# Patient Record
Sex: FEMALE | Race: WHITE | NOT HISPANIC OR LATINO | Employment: OTHER | URBAN - METROPOLITAN AREA
[De-identification: names, ages, dates, MRNs, and addresses within clinical notes are randomized per-mention and may not be internally consistent; named-entity substitution may affect disease eponyms.]

---

## 2023-05-08 LAB
CHOLESTEROL (MG/DL) IN SER/PLAS: 163 MG/DL (ref 0–199)
CHOLESTEROL IN HDL (MG/DL) IN SER/PLAS: 57.9 MG/DL
CHOLESTEROL/HDL RATIO: 2.8
LDL: 79 MG/DL (ref 0–99)
TRIGLYCERIDE (MG/DL) IN SER/PLAS: 133 MG/DL (ref 0–149)
VLDL: 27 MG/DL (ref 0–40)

## 2023-10-07 ASSESSMENT — ENCOUNTER SYMPTOMS
MYALGIAS: 0
BELCHING: 1
FEVER: 0
HEADACHES: 0
DIARRHEA: 1
ANOREXIA: 0
HEMATURIA: 0
NAUSEA: 0
ARTHRALGIAS: 0
HEMATOCHEZIA: 1
VOMITING: 0
CONSTIPATION: 1
WEIGHT LOSS: 0
ABDOMINAL PAIN: 1
DYSURIA: 0

## 2023-10-09 ENCOUNTER — OFFICE VISIT (OUTPATIENT)
Dept: PRIMARY CARE | Facility: CLINIC | Age: 63
End: 2023-10-09
Payer: MEDICARE

## 2023-10-09 ENCOUNTER — LAB (OUTPATIENT)
Dept: LAB | Facility: LAB | Age: 63
End: 2023-10-09
Payer: MEDICARE

## 2023-10-09 VITALS
HEIGHT: 65 IN | HEART RATE: 69 BPM | RESPIRATION RATE: 16 BRPM | OXYGEN SATURATION: 98 % | SYSTOLIC BLOOD PRESSURE: 119 MMHG | DIASTOLIC BLOOD PRESSURE: 75 MMHG | WEIGHT: 173 LBS | BODY MASS INDEX: 28.82 KG/M2

## 2023-10-09 DIAGNOSIS — R73.03 PREDIABETES: ICD-10-CM

## 2023-10-09 DIAGNOSIS — Z12.11 COLON CANCER SCREENING: ICD-10-CM

## 2023-10-09 DIAGNOSIS — R10.9 ABDOMINAL PAIN, UNSPECIFIED ABDOMINAL LOCATION: Primary | ICD-10-CM

## 2023-10-09 DIAGNOSIS — R10.9 ABDOMINAL PAIN, UNSPECIFIED ABDOMINAL LOCATION: ICD-10-CM

## 2023-10-09 PROBLEM — L82.0 INFLAMED SEBORRHEIC KERATOSIS: Status: ACTIVE | Noted: 2022-01-20

## 2023-10-09 PROBLEM — I20.1 CORONARY ARTERY SPASM (CMS-HCC): Status: ACTIVE | Noted: 2023-10-09

## 2023-10-09 PROBLEM — K21.9 GERD (GASTROESOPHAGEAL REFLUX DISEASE): Status: ACTIVE | Noted: 2023-10-09

## 2023-10-09 PROBLEM — E78.5 HLD (HYPERLIPIDEMIA): Status: ACTIVE | Noted: 2023-10-09

## 2023-10-09 PROBLEM — K58.9 IBS (IRRITABLE BOWEL SYNDROME): Status: ACTIVE | Noted: 2023-10-09

## 2023-10-09 PROBLEM — I73.00 RAYNAUD PHENOMENON: Status: ACTIVE | Noted: 2023-10-09

## 2023-10-09 LAB
ALBUMIN SERPL BCP-MCNC: 4.7 G/DL (ref 3.4–5)
ALP SERPL-CCNC: 76 U/L (ref 33–136)
ALT SERPL W P-5'-P-CCNC: 26 U/L (ref 7–45)
AMYLASE SERPL-CCNC: 48 U/L (ref 29–103)
ANION GAP SERPL CALC-SCNC: 11 MMOL/L (ref 10–20)
AST SERPL W P-5'-P-CCNC: 23 U/L (ref 9–39)
BILIRUB SERPL-MCNC: 0.4 MG/DL (ref 0–1.2)
BUN SERPL-MCNC: 16 MG/DL (ref 6–23)
CALCIUM SERPL-MCNC: 9.6 MG/DL (ref 8.6–10.6)
CHLORIDE SERPL-SCNC: 106 MMOL/L (ref 98–107)
CO2 SERPL-SCNC: 30 MMOL/L (ref 21–32)
CREAT SERPL-MCNC: 0.61 MG/DL (ref 0.5–1.05)
ERYTHROCYTE [DISTWIDTH] IN BLOOD BY AUTOMATED COUNT: 12.4 % (ref 11.5–14.5)
GFR SERPL CREATININE-BSD FRML MDRD: >90 ML/MIN/1.73M*2
GLUCOSE SERPL-MCNC: 106 MG/DL (ref 74–99)
HCT VFR BLD AUTO: 46.3 % (ref 36–46)
HGB BLD-MCNC: 14.4 G/DL (ref 12–16)
LIPASE SERPL-CCNC: 32 U/L (ref 9–82)
MCH RBC QN AUTO: 29.6 PG (ref 26–34)
MCHC RBC AUTO-ENTMCNC: 31.1 G/DL (ref 32–36)
MCV RBC AUTO: 95 FL (ref 80–100)
NRBC BLD-RTO: 0 /100 WBCS (ref 0–0)
PLATELET # BLD AUTO: 265 X10*3/UL (ref 150–450)
PMV BLD AUTO: 10.3 FL (ref 7.5–11.5)
POTASSIUM SERPL-SCNC: 4.5 MMOL/L (ref 3.5–5.3)
PROT SERPL-MCNC: 7.2 G/DL (ref 6.4–8.2)
RBC # BLD AUTO: 4.86 X10*6/UL (ref 4–5.2)
SODIUM SERPL-SCNC: 142 MMOL/L (ref 136–145)
WBC # BLD AUTO: 6.6 X10*3/UL (ref 4.4–11.3)

## 2023-10-09 PROCEDURE — 83690 ASSAY OF LIPASE: CPT

## 2023-10-09 PROCEDURE — 1159F MED LIST DOCD IN RCRD: CPT | Performed by: FAMILY MEDICINE

## 2023-10-09 PROCEDURE — 80053 COMPREHEN METABOLIC PANEL: CPT

## 2023-10-09 PROCEDURE — 82150 ASSAY OF AMYLASE: CPT

## 2023-10-09 PROCEDURE — 83036 HEMOGLOBIN GLYCOSYLATED A1C: CPT

## 2023-10-09 PROCEDURE — 99214 OFFICE O/P EST MOD 30 MIN: CPT | Performed by: FAMILY MEDICINE

## 2023-10-09 PROCEDURE — 85027 COMPLETE CBC AUTOMATED: CPT

## 2023-10-09 PROCEDURE — 1036F TOBACCO NON-USER: CPT | Performed by: FAMILY MEDICINE

## 2023-10-09 PROCEDURE — 1160F RVW MEDS BY RX/DR IN RCRD: CPT | Performed by: FAMILY MEDICINE

## 2023-10-09 PROCEDURE — 36415 COLL VENOUS BLD VENIPUNCTURE: CPT

## 2023-10-09 RX ORDER — LYSINE HCL 500 MG
2 TABLET ORAL DAILY
COMMUNITY
Start: 2018-05-21

## 2023-10-09 RX ORDER — CHOLECALCIFEROL (VITAMIN D3) 50 MCG
1 TABLET ORAL DAILY
COMMUNITY
Start: 2018-05-21

## 2023-10-09 RX ORDER — AMLODIPINE BESYLATE 5 MG/1
TABLET ORAL
COMMUNITY
Start: 2023-06-06

## 2023-10-09 RX ORDER — OMEPRAZOLE 40 MG/1
40 CAPSULE, DELAYED RELEASE ORAL
Qty: 30 CAPSULE | Refills: 0 | Status: SHIPPED | OUTPATIENT
Start: 2023-10-09

## 2023-10-09 RX ORDER — ATORVASTATIN CALCIUM 80 MG/1
80 TABLET, FILM COATED ORAL NIGHTLY
COMMUNITY
End: 2024-06-04 | Stop reason: SDUPTHER

## 2023-10-09 RX ORDER — LUTEIN 6 MG
2 TABLET ORAL DAILY
COMMUNITY
Start: 2018-05-21

## 2023-10-09 RX ORDER — PHENOL 1.4 %
1 AEROSOL, SPRAY (ML) MUCOUS MEMBRANE DAILY
COMMUNITY
Start: 2018-05-21

## 2023-10-09 NOTE — PATIENT INSTRUCTIONS
Advised patient of Doctor's note below. Patient verbalized understanding. No further questions at this time. Discussed DDx including GERD vs Diverticulitis vs Epiploic appendagitis vs pancreatitis vs Ulcer vs Malignancy (colon cancer vs ovarian cancer due to family history).  Nonfasting labs.  CT abdomen/pelvis.  Omeprazole provided.  Colonoscopy referral.  ER visit immediately if blood in stool returns.  Call, send message through [email or web address], or return to office prn if these symptoms worsen or fail to improve as anticipated.      Schedule annual wellness visit.

## 2023-10-09 NOTE — PROGRESS NOTES
"Subjective   Patient ID: Karyna Noguera is a 63 y.o. female who presents for Abdominal Pain (Digestive issue).    HPI  Last visit 9/20/21.    Reports history of irritable bowel syndrome.  Reports umbilical to LLQ pain x couple months.  Described as ache, almost like hunger pain.  Intermittent.  Daily.  Worse w/eating.  Improved w/BM.  Rated at max 5-6/10.  Rated on average 3/10.  Asso w/alternating diarrhea/constipation, occ nausea/vomiting.  No fevers.  Had bright red blood in stool 3 months ago (one night and following morning).  +Belching, occ acid taste in throat.  Did not schedule colonoscopy as ordered at last visit >2 yrs ago.  +FH colon cancer and ovarian cancer.    ROS  No other complaints.     Objective   /75   Pulse 69   Resp 16   Ht 1.651 m (5' 5\")   Wt 78.5 kg (173 lb)   SpO2 98%   BMI 28.79 kg/m²     Physical Exam  Constitutional:       General: She is not in acute distress.     Appearance: Normal appearance.   Abdominal:      General: Bowel sounds are normal. There is no distension.      Palpations: Abdomen is soft. There is no mass.      Tenderness: There is abdominal tenderness in the periumbilical area and left lower quadrant. There is no guarding or rebound.   Neurological:      Mental Status: She is oriented to person, place, and time.   Psychiatric:         Mood and Affect: Mood normal.         Behavior: Behavior normal.     Assessment/Plan   Diagnoses and all orders for this visit:  Abdominal pain, unspecified abdominal location  -     CBC; Future  -     Comprehensive metabolic panel; Future  -     Amylase; Future  -     Lipase; Future  -     CT abdomen pelvis wo IV contrast; Future  -     omeprazole (PriLOSEC) 40 mg DR capsule; Take 1 capsule (40 mg) by mouth once daily in the morning. Take before meals. Do not crush or chew.  Colon cancer screening  -     Colonoscopy Screening; Future    Discussed DDx including GERD vs Diverticulitis vs Epiploic appendagitis vs pancreatitis vs " Ulcer vs Malignancy (colon cancer vs ovarian cancer due to family history).  Nonfasting labs.  CT abdomen/pelvis.  Omeprazole provided.  Colonoscopy referral.  ER visit immediately if blood in stool returns.  Call, send message through [employer], or return to office prn if these symptoms worsen or fail to improve as anticipated.      Schedule annual wellness visit.    supervision

## 2023-10-10 DIAGNOSIS — R73.03 PREDIABETES: Primary | ICD-10-CM

## 2023-10-10 PROBLEM — R73.09 ELEVATED GLUCOSE: Status: ACTIVE | Noted: 2023-10-10

## 2023-10-10 PROBLEM — R73.09 ELEVATED GLUCOSE: Status: RESOLVED | Noted: 2023-10-10 | Resolved: 2023-10-10

## 2023-10-10 LAB
EST. AVERAGE GLUCOSE BLD GHB EST-MCNC: 117 MG/DL
HBA1C MFR BLD: 5.7 %

## 2023-10-11 DIAGNOSIS — Z12.11 COLON CANCER SCREENING: ICD-10-CM

## 2023-10-11 RX ORDER — POLYETHYLENE GLYCOL 3350, SODIUM SULFATE ANHYDROUS, SODIUM BICARBONATE, SODIUM CHLORIDE, POTASSIUM CHLORIDE 236; 22.74; 6.74; 5.86; 2.97 G/4L; G/4L; G/4L; G/4L; G/4L
4000 POWDER, FOR SOLUTION ORAL ONCE
Qty: 4000 ML | Refills: 0 | Status: SHIPPED | OUTPATIENT
Start: 2023-10-11 | End: 2023-10-11

## 2023-10-19 ENCOUNTER — ANCILLARY PROCEDURE (OUTPATIENT)
Dept: RADIOLOGY | Facility: CLINIC | Age: 63
End: 2023-10-19
Payer: MEDICARE

## 2023-10-19 DIAGNOSIS — R10.9 ABDOMINAL PAIN, UNSPECIFIED ABDOMINAL LOCATION: ICD-10-CM

## 2023-10-19 PROCEDURE — 74176 CT ABD & PELVIS W/O CONTRAST: CPT | Mod: ME

## 2023-10-19 PROCEDURE — 74176 CT ABD & PELVIS W/O CONTRAST: CPT | Performed by: RADIOLOGY

## 2023-10-24 PROBLEM — K42.9 UMBILICAL HERNIA WITHOUT OBSTRUCTION AND WITHOUT GANGRENE: Status: ACTIVE | Noted: 2023-10-24

## 2023-10-24 PROBLEM — K57.90 DIVERTICULOSIS: Status: ACTIVE | Noted: 2023-10-24

## 2023-10-24 PROBLEM — K76.0 FATTY LIVER: Status: ACTIVE | Noted: 2023-10-24

## 2023-11-01 ENCOUNTER — APPOINTMENT (OUTPATIENT)
Dept: GASTROENTEROLOGY | Facility: EXTERNAL LOCATION | Age: 63
End: 2023-11-01
Payer: MEDICARE

## 2023-11-09 ENCOUNTER — OFFICE VISIT (OUTPATIENT)
Dept: GASTROENTEROLOGY | Facility: EXTERNAL LOCATION | Age: 63
End: 2023-11-09
Payer: MEDICARE

## 2023-11-09 DIAGNOSIS — D12.8 BENIGN NEOPLASM OF RECTUM AND ANAL CANAL: ICD-10-CM

## 2023-11-09 DIAGNOSIS — Z80.0 FAMILY HISTORY OF MALIGNANT NEOPLASM OF GASTROINTESTINAL TRACT: ICD-10-CM

## 2023-11-09 DIAGNOSIS — Z12.11 COLON CANCER SCREENING: Primary | ICD-10-CM

## 2023-11-09 DIAGNOSIS — K64.8 INTERNAL HEMORRHOIDS: ICD-10-CM

## 2023-11-09 DIAGNOSIS — Z12.11 COLON CANCER SCREENING: ICD-10-CM

## 2023-11-09 DIAGNOSIS — K57.30 DIVERTICULOSIS OF LARGE INTESTINE WITHOUT DIVERTICULITIS: ICD-10-CM

## 2023-11-09 DIAGNOSIS — D12.9 BENIGN NEOPLASM OF RECTUM AND ANAL CANAL: ICD-10-CM

## 2023-11-09 PROCEDURE — 1159F MED LIST DOCD IN RCRD: CPT | Performed by: INTERNAL MEDICINE

## 2023-11-09 PROCEDURE — 45385 COLONOSCOPY W/LESION REMOVAL: CPT | Performed by: INTERNAL MEDICINE

## 2023-11-09 PROCEDURE — 1036F TOBACCO NON-USER: CPT | Performed by: INTERNAL MEDICINE

## 2023-11-09 PROCEDURE — 1160F RVW MEDS BY RX/DR IN RCRD: CPT | Performed by: INTERNAL MEDICINE

## 2024-06-04 ENCOUNTER — OFFICE VISIT (OUTPATIENT)
Dept: CARDIOLOGY | Facility: HOSPITAL | Age: 64
End: 2024-06-04
Payer: MEDICARE

## 2024-06-04 VITALS
HEART RATE: 59 BPM | BODY MASS INDEX: 29.99 KG/M2 | HEIGHT: 65 IN | SYSTOLIC BLOOD PRESSURE: 105 MMHG | WEIGHT: 180 LBS | DIASTOLIC BLOOD PRESSURE: 70 MMHG

## 2024-06-04 DIAGNOSIS — E78.5 HYPERLIPIDEMIA, UNSPECIFIED HYPERLIPIDEMIA TYPE: Primary | ICD-10-CM

## 2024-06-04 LAB
ATRIAL RATE: 59 BPM
P AXIS: 72 DEGREES
P OFFSET: 189 MS
P ONSET: 132 MS
PR INTERVAL: 182 MS
Q ONSET: 223 MS
QRS COUNT: 10 BEATS
QRS DURATION: 78 MS
QT INTERVAL: 398 MS
QTC CALCULATION(BAZETT): 394 MS
QTC FREDERICIA: 396 MS
R AXIS: 63 DEGREES
T AXIS: 77 DEGREES
T OFFSET: 422 MS
VENTRICULAR RATE: 59 BPM

## 2024-06-04 PROCEDURE — 1159F MED LIST DOCD IN RCRD: CPT | Performed by: INTERNAL MEDICINE

## 2024-06-04 PROCEDURE — 1036F TOBACCO NON-USER: CPT | Performed by: INTERNAL MEDICINE

## 2024-06-04 PROCEDURE — 93005 ELECTROCARDIOGRAM TRACING: CPT | Performed by: INTERNAL MEDICINE

## 2024-06-04 PROCEDURE — 93010 ELECTROCARDIOGRAM REPORT: CPT | Performed by: INTERNAL MEDICINE

## 2024-06-04 PROCEDURE — 99213 OFFICE O/P EST LOW 20 MIN: CPT | Performed by: INTERNAL MEDICINE

## 2024-06-04 PROCEDURE — 99213 OFFICE O/P EST LOW 20 MIN: CPT | Mod: 25 | Performed by: INTERNAL MEDICINE

## 2024-06-04 PROCEDURE — 1160F RVW MEDS BY RX/DR IN RCRD: CPT | Performed by: INTERNAL MEDICINE

## 2024-06-04 RX ORDER — ATORVASTATIN CALCIUM 80 MG/1
80 TABLET, FILM COATED ORAL NIGHTLY
Qty: 90 TABLET | Refills: 3 | Status: SHIPPED | OUTPATIENT
Start: 2024-06-04 | End: 2025-06-04

## 2024-06-04 NOTE — PROGRESS NOTES
Subjective:  Patient returns for a routine annual follow-up.  I was pleased to see that he has generally been clinically stable.  We follow her for marked hyperlipidemia.  She also has some Raynaud's.  She fortunately got through the winter with minimal need for amlodipine.  She denies any cardiac complaints at this time.  She has been compliant with her high-dose atorvastatin.  She has not had any hospitalizations and denies any other new health concerns.    Objective:  General: Alert, usual pleasant self.  HEENT: Unchanged.  Lungs: Clear without crackles or wheezing.  Cardiac: Distant heart tones without murmur rub or S3.  Abdomen: Nontender with normal bowel sounds.  Extremities: No edema.  Skin: Warm, dry without rash.    EKG: Sinus bradycardia.  Otherwise normal tracing.    Lipid panel: Cholesterol-163, HDL-58, LDL-79, TG-133.    Impression/plan:  Karyna is generally is doing quite well at this time.  She remains free of significant cardiac complaints.  Her blood pressure is in good range without need for antihypertensive therapy.  Her LDL looks quite significantly improved on high-dose atorvastatin.    I elected to embark on no additional testing at this time and will not make any medication changes.  I will see her back for routine annual follow-up assuming no intercurrent problems.    Patient instructions:    Continue current medications unchanged.    Return to clinic in 1 year.

## 2024-09-26 ENCOUNTER — HOSPITAL ENCOUNTER (OUTPATIENT)
Dept: RADIOLOGY | Facility: CLINIC | Age: 64
Discharge: HOME | End: 2024-09-26
Payer: MEDICARE

## 2024-09-26 ENCOUNTER — OFFICE VISIT (OUTPATIENT)
Dept: PRIMARY CARE | Facility: CLINIC | Age: 64
End: 2024-09-26
Payer: MEDICARE

## 2024-09-26 VITALS
WEIGHT: 182 LBS | HEIGHT: 65 IN | HEART RATE: 69 BPM | OXYGEN SATURATION: 98 % | BODY MASS INDEX: 30.32 KG/M2 | DIASTOLIC BLOOD PRESSURE: 71 MMHG | RESPIRATION RATE: 16 BRPM | SYSTOLIC BLOOD PRESSURE: 111 MMHG

## 2024-09-26 DIAGNOSIS — M79.604 PAIN OF RIGHT LOWER EXTREMITY: Primary | ICD-10-CM

## 2024-09-26 DIAGNOSIS — M79.604 PAIN OF RIGHT LOWER EXTREMITY: ICD-10-CM

## 2024-09-26 PROCEDURE — 1036F TOBACCO NON-USER: CPT | Performed by: FAMILY MEDICINE

## 2024-09-26 PROCEDURE — 3008F BODY MASS INDEX DOCD: CPT | Performed by: FAMILY MEDICINE

## 2024-09-26 PROCEDURE — 1160F RVW MEDS BY RX/DR IN RCRD: CPT | Performed by: FAMILY MEDICINE

## 2024-09-26 PROCEDURE — 73502 X-RAY EXAM HIP UNI 2-3 VIEWS: CPT | Mod: RIGHT SIDE | Performed by: STUDENT IN AN ORGANIZED HEALTH CARE EDUCATION/TRAINING PROGRAM

## 2024-09-26 PROCEDURE — 1159F MED LIST DOCD IN RCRD: CPT | Performed by: FAMILY MEDICINE

## 2024-09-26 PROCEDURE — 72110 X-RAY EXAM L-2 SPINE 4/>VWS: CPT

## 2024-09-26 PROCEDURE — 1123F ACP DISCUSS/DSCN MKR DOCD: CPT | Performed by: FAMILY MEDICINE

## 2024-09-26 PROCEDURE — 99214 OFFICE O/P EST MOD 30 MIN: CPT | Performed by: FAMILY MEDICINE

## 2024-09-26 PROCEDURE — 73502 X-RAY EXAM HIP UNI 2-3 VIEWS: CPT | Mod: RT

## 2024-09-26 RX ORDER — METHYLPREDNISOLONE 4 MG/1
TABLET ORAL
Qty: 21 TABLET | Refills: 0 | Status: SHIPPED | OUTPATIENT
Start: 2024-09-26 | End: 2024-10-03

## 2024-09-26 RX ORDER — METHOCARBAMOL 500 MG/1
500 TABLET, FILM COATED ORAL 4 TIMES DAILY PRN
Qty: 30 TABLET | Refills: 0 | Status: SHIPPED | OUTPATIENT
Start: 2024-09-26

## 2024-09-26 ASSESSMENT — ENCOUNTER SYMPTOMS
LOSS OF SENSATION IN FEET: 0
DEPRESSION: 0
OCCASIONAL FEELINGS OF UNSTEADINESS: 0

## 2024-09-26 NOTE — PATIENT INSTRUCTIONS
X-rays.  Oral steroid (do not take NSAIDs while taking steroid).  Muscle relaxer as needed.  Will consider physical therapy referral if symptoms persist.   Call, send message through [employer], or return to office prn if these symptoms worsen or fail to improve as anticipated.     Schedule annual wellness visit.

## 2024-09-26 NOTE — PROGRESS NOTES
"Subjective   Patient ID: Karyna Noguera is a 64 y.o. female who presents for Groin Pain (Right side down right leg ).    HPI  Right medial groin pain x 3 wks.  Onset after doing a lot of yard work.  Pain is sharp, shooting, ache.  Constant.  Worse w/bending, lifting leg, rotating hip.  Improved w/inactivity.  No help w/Aleve.  Rated at max 10/10.  Rated on average 5-6/10.  Radiates down to top of right foot.  No saddle anesthesia or incontinence.  No fevers.    Review of Systems  No other complaints.     Objective   /71   Pulse 69   Resp 16   Ht 1.651 m (5' 5\")   Wt 82.6 kg (182 lb)   SpO2 98%   BMI 30.29 kg/m²     Physical Exam  Constitutional:       General: She is not in acute distress.     Appearance: She is obese.   Musculoskeletal:      Lumbar back: No tenderness. Normal range of motion. Negative right straight leg raise test and negative left straight leg raise test.      Right hip: Decreased range of motion (due to pain).      Left hip: Normal range of motion.      Comments: Antalgic gait   Neurological:      Mental Status: She is oriented to person, place, and time.   Psychiatric:         Mood and Affect: Mood normal.         Behavior: Behavior normal.     Assessment/Plan   Diagnoses and all orders for this visit:  Pain of right lower extremity  -     XR lumbar spine 2-3 views; Future  -     XR hip right with pelvis when performed 2 or 3 views; Future  -     methylPREDNISolone (Medrol Dospak) 4 mg tablets; Take as directed on package.  -     methocarbamol (Robaxin) 500 mg tablet; Take 1 tablet (500 mg) by mouth 4 times a day as needed for muscle spasms.    X-rays.  Oral steroid (do not take NSAIDs while taking steroid).  Muscle relaxer as needed.  Will consider physical therapy referral if symptoms persist.   Call, send message through [employer], or return to office prn if these symptoms worsen or fail to improve as anticipated.     Schedule annual wellness visit.   "

## 2024-10-04 DIAGNOSIS — M79.604 PAIN OF RIGHT LOWER EXTREMITY: Primary | ICD-10-CM

## 2024-10-21 ENCOUNTER — EVALUATION (OUTPATIENT)
Dept: PHYSICAL THERAPY | Facility: CLINIC | Age: 64
End: 2024-10-21
Payer: MEDICARE

## 2024-10-21 DIAGNOSIS — M79.604 PAIN OF RIGHT LOWER EXTREMITY: ICD-10-CM

## 2024-10-21 PROCEDURE — 97110 THERAPEUTIC EXERCISES: CPT | Mod: GP | Performed by: PHYSICAL THERAPIST

## 2024-10-21 PROCEDURE — 97162 PT EVAL MOD COMPLEX 30 MIN: CPT | Mod: GP | Performed by: PHYSICAL THERAPIST

## 2024-10-21 ASSESSMENT — ENCOUNTER SYMPTOMS
LOSS OF SENSATION IN FEET: 0
OCCASIONAL FEELINGS OF UNSTEADINESS: 0
DEPRESSION: 0

## 2024-10-21 ASSESSMENT — COLUMBIA-SUICIDE SEVERITY RATING SCALE - C-SSRS
2. HAVE YOU ACTUALLY HAD ANY THOUGHTS OF KILLING YOURSELF?: NO
1. IN THE PAST MONTH, HAVE YOU WISHED YOU WERE DEAD OR WISHED YOU COULD GO TO SLEEP AND NOT WAKE UP?: NO
6. HAVE YOU EVER DONE ANYTHING, STARTED TO DO ANYTHING, OR PREPARED TO DO ANYTHING TO END YOUR LIFE?: NO

## 2024-10-21 ASSESSMENT — PATIENT HEALTH QUESTIONNAIRE - PHQ9
2. FEELING DOWN, DEPRESSED OR HOPELESS: NOT AT ALL
SUM OF ALL RESPONSES TO PHQ9 QUESTIONS 1 AND 2: 0
1. LITTLE INTEREST OR PLEASURE IN DOING THINGS: NOT AT ALL

## 2024-10-21 NOTE — PROGRESS NOTES
"Physical Therapy Evaluation    Patient Name: Karyna Noguera  MRN: [id number]  Today's Date: 10/21/2024  Visit: 1/MN  Referred by: Dr. Land  Time Calculation  Start Time: 1235  Stop Time: 1320  Time Calculation (min): 45 min  Diagnosis:   1. Pain of right lower extremity  Referral to Physical Therapy    Follow Up In Physical Therapy          PRECAUTIONS:   Low fall risk    SUBJECTIVE:  Patient reports over a month of (R) hip pain which radiates down to the leg to the knee and occasionally down to the foot. Does agree to some tingling and heaviness. Weakness in the (R)LE. Walking with a limp. Tried a steroid pack for a week without much change of symptoms. Insidious onset. Describes what can be a firework sensation which is abrupt.  Pain:  3/10 dull ache (R) thigh; max pain 9/10 \"electrical or firework, spasm\"  Home Living:  No concerns  Prior level of function:  Prior to onset of pain, was not limited in any ADLs. She occasionally goes on walks for exercise.  Personal factors that may impact care:  None    OBJECTIVE:  Lumbar AROM: (% movement)   Flexion 75%   Extension 50%*   Right Sidebend 50%*   Left Sidebend 75%   Right Rotation 50%*   Left Rotation 50%     Lumbar Repetitive Movement Response   Flexion in standing RLE pain, needs to bend knee   Extension in standing Midline LBP with (R) gluteal pain   Right Sideglide Increased central RLE pain including gluteal pain and groin pain   Left Sideglide No drastic change of symptoms   Flexion in supine Significant increase in RLE pain - primarily in L3 distribution   Extension in prone      Lumbar Sustained Movement Response   Flexion in standing RLE pain, needs to bend knee   Extension in standing Midline LBP with (R) gluteal pain   Right Sidelying Flex/Rot Significant (R) gluteal pain, difficult to discern peripheralization vs centralization   Left Sideglide No drastic change of symptoms   Flexion in supine Significant increase in RLE pain - primarily in L3 " distribution   Extension in prone Midline LBP with possible centralization - no lower leg symptoms     Core Strength: Sahrmann level 3+  Palpation: Increased TTP (R) gluteal mass  Special Testing: +VIVEK, + FADIR on RLE but this may be a false positive secondary to radicular pain upon flexion  Dermatomal Impairment: Pain following L3 dermatome  Myotomal Impairment:  (R) hip flexion- 3/5 (L3)    Outcome Measure:  LEFS: 35/80    ASSESSMENT:  Patient is a 64 year old female who presents to therapy on this date for evaluation of their (R)LE pain. Examination on this date reveals signs and symptoms suggestive of (R)LE L3 radiculopathy, which may be discal in nature. Deficits including weakness, abnormal gait, decreased/painful ROM, and altered balance; all of which are leading to difficulties with ADLs as well as recreational activity. Skilled physical therapy will address these deficits to help reduce pain for return to prior level of function.    Problem list: including weakness, abnormal gait, decreased/painful ROM, and altered balance    Moderate complexity due to patient's clinical presentation being evolving with changing characteristics, with comorbidities/complexities to include those listed above, all of which may negatively impact rehab tolerance and progression.     Clinical presentation:  Evolving with changing characteristics    TREATMENT:  - Therex:  Prone lying 1 min x3  Prone on elbows 1 min x3  Standing lumbar extensions x10  - Modalities:  CP to the lumbar spine in hooklying to conclude treatment    PATIENT EDUCATION:  Outpatient Education  Individual(s) Educated: Patient  Education Provided: Anatomy, Home Exercise Program, Physiology, POC, Posture  Patient/Caregiver Demonstrated Understanding: yes  Plan of Care Discussed and Agreed Upon: yes  Patient Response to Education: Patient/Caregiver Verbalized Understanding of Information, Patient/Caregiver Performed Return Demonstration of  Exercises/Activities, Patient/Caregiver Asked Appropriate Questions    HEP:  Access Code: [email or web address]  URL: [email or web address]  Date: 10/21/2024  Prepared by: Polo Sr    Exercises  - Lying Prone with 1 Pillow  - 2-3 x daily - 7 x weekly - 3 reps - 3 mins hold  - Lying Prone  - 2-3 x daily - 7 x weekly - 3 reps - 1 min hold  - Static Prone on Elbows  - 2-3 x daily - 7 x weekly - 3 reps - 1 min hold  - Standing Lumbar Extension  - 2-3 x daily - 7 x weekly - 2 sets - 10 reps    PLAN:   Treatment/Interventions: Cryotherapy, Education/ Instruction, Hot pack, Manual therapy, Mechanical traction, Neuromuscular re-education, Self care/ home management, Therapeutic exercises  PT Plan: Skilled PT  PT Frequency: 1 time per week  Duration: 8 weeks  Certification Period Start Date: 10/21/24  Certification Period End Date: 01/19/25  Rehab Potential: Fair  Plan of Care Agreement: Patient    GOALS:  Active       PT Problem       PT to exhibit normal gait pattern without evidence of antalgia.       Start:  10/21/24    Expected End:  12/16/24            Patient Stated Goal: no pain for return to normal activities including bowling.       Start:  10/21/24    Expected End:  12/16/24            Patient will maintain single leg stand right for >15 sec without loss of balance to indicate return of function.       Start:  10/21/24    Expected End:  12/16/24            Patient will achieve right hip flexion strength of 4+/5       Start:  10/21/24    Expected End:  12/16/24            Patient will demonstrate independence in home program for support of progression       Start:  10/21/24    Expected End:  12/16/24            Patient will report max pain of 3/10 demonstrating a reduction of overall pain       Start:  10/21/24    Expected End:  12/16/24

## 2024-10-24 ENCOUNTER — TREATMENT (OUTPATIENT)
Dept: PHYSICAL THERAPY | Facility: CLINIC | Age: 64
End: 2024-10-24
Payer: MEDICARE

## 2024-10-24 DIAGNOSIS — M79.604 PAIN OF RIGHT LOWER EXTREMITY: Primary | ICD-10-CM

## 2024-10-24 PROCEDURE — 97110 THERAPEUTIC EXERCISES: CPT | Mod: GP | Performed by: PHYSICAL THERAPIST

## 2024-10-24 NOTE — PROGRESS NOTES
"Physical Therapy Treatment    Patient Name: Karyna Noguera  MRN: [id number]  Today's Date: 10/24/2024   Visit 2/MN  Time Calculation  Start Time: 1445  Stop Time: 1515  Time Calculation (min): 30 min  1. Pain of right lower extremity            PRECAUTIONS:  Fall Risk: Low    SUBJECTIVE:  Patient reports that her symptoms are not too different despite 2 days of HEP. Has been icing as well. Did spend over an hour driving yesterday which resulted in increased pain.  Pain level: 3/10 dull ache (R) thigh; max pain 9/10 \"electrical or firework, spasm\"  Compliant with HEP? Yes    OBJECTIVE:  Varying degrees of RLE radicular pain with movement but reduced with extension and sidelying flexion/rotation positioning.    TREATMENT:  - Therex:  LUE on wall sideglide mobilizations (L) hip falling towards wall 2x10  Standing lumbar extensions 2x10  (R) sidelying flexion/rotation x3 mins  (R) sidelying flexion/rotation open books 2x10  Prone lying/prone prop cycling 30 sec cycles x3  Prone press up x10    - Manual Therapy:  Lateral shift correction shifting hips to the (L) - 2 mins      ASSESSMENT:  Patient likely still with radicular symptoms in the RLE secondary to lateral disc herniation. Ongoing sustained and repeated movements needed to further resolve symptoms.    EDUCATION:  Added sideglides hips to the (L)    PLAN:    Monitor symptoms closely 2x/week in clinic to ensure radicular symptoms are resolving with prescribed movements of the spine.    "

## 2024-10-28 ENCOUNTER — TREATMENT (OUTPATIENT)
Dept: PHYSICAL THERAPY | Facility: CLINIC | Age: 64
End: 2024-10-28
Payer: MEDICARE

## 2024-10-28 DIAGNOSIS — M79.604 PAIN OF RIGHT LOWER EXTREMITY: ICD-10-CM

## 2024-10-28 PROCEDURE — 97110 THERAPEUTIC EXERCISES: CPT | Mod: GP | Performed by: PHYSICAL THERAPIST

## 2024-11-06 ENCOUNTER — TREATMENT (OUTPATIENT)
Dept: PHYSICAL THERAPY | Facility: CLINIC | Age: 64
End: 2024-11-06
Payer: MEDICARE

## 2024-11-06 DIAGNOSIS — M79.604 PAIN OF RIGHT LOWER EXTREMITY: Primary | ICD-10-CM

## 2024-11-06 PROCEDURE — 97535 SELF CARE MNGMENT TRAINING: CPT | Mod: GP | Performed by: PHYSICAL THERAPIST

## 2024-11-06 PROCEDURE — 97012 MECHANICAL TRACTION THERAPY: CPT | Mod: GP | Performed by: PHYSICAL THERAPIST

## 2024-11-06 NOTE — PROGRESS NOTES
"Physical Therapy Treatment    Patient Name: Karyna Noguera  MRN: [id number]  Today's Date: 11/6/2024   Visit 4/MN  Time Calculation  Start Time: 1717  Stop Time: 1800  Time Calculation (min): 43 min  1. Pain of right lower extremity              PRECAUTIONS:  Fall Risk: Low    SUBJECTIVE:  Patient reports that her symptoms seem no different since last week. She still has a sharp pain in the (R) hip upon stand from a seated position. The open-book exercises still help quite a bit but the sideglides seem to be worsening pain. Difficulty sitting on firm surfaces.  Pain level: 3/10 dull ache (R) thigh; max pain 9/10 \"electrical or firework, spasm\"  Compliant with HEP? Yes    OBJECTIVE:  Sharp (R) hip pain with passive log roll IR or ER, passive hip flexion, abduction     TREATMENT:  - Manual Therapy:  RLE long axis distraction - grade 2-3 - ceased due to pain  Hooklying (R) hip mobilizations, inferior grade 2-3 - ceased due to pain    - Modalities:  Intermittent traction to the lumbar spine 85#/35#, 15mins 60\"/10\"    ASSESSMENT:  Patient's (R) radicular symptoms seemingly improved as compared to start of therapy. Unfortunately, she is still suffering from significant sharp pain in the (R) hip joint which could be indicative of labral injury or loose body. Further investigation from orthopedics may be helpful in identifying source of pain as well as offer other treatment options.    EDUCATION:      PLAN:   Hold on PT and send pt to hip specialist.    "

## 2024-11-08 ENCOUNTER — PREP FOR PROCEDURE (OUTPATIENT)
Dept: ORTHOPEDIC SURGERY | Age: 64
End: 2024-11-08
Payer: MEDICARE

## 2024-11-15 ENCOUNTER — APPOINTMENT (OUTPATIENT)
Dept: PHYSICAL THERAPY | Facility: CLINIC | Age: 64
End: 2024-11-15
Payer: MEDICARE

## 2024-11-18 ENCOUNTER — HOSPITAL ENCOUNTER (OUTPATIENT)
Dept: RADIOLOGY | Facility: CLINIC | Age: 64
Discharge: HOME | End: 2024-11-18
Payer: MEDICARE

## 2024-11-18 ENCOUNTER — APPOINTMENT (OUTPATIENT)
Dept: ORTHOPEDIC SURGERY | Facility: CLINIC | Age: 64
End: 2024-11-18
Payer: MEDICARE

## 2024-11-18 VITALS — HEIGHT: 65 IN | WEIGHT: 185 LBS | BODY MASS INDEX: 30.82 KG/M2

## 2024-11-18 DIAGNOSIS — M79.604 PAIN OF RIGHT LOWER EXTREMITY: ICD-10-CM

## 2024-11-18 DIAGNOSIS — S76.011A STRAIN OF HIP FLEXOR, RIGHT, INITIAL ENCOUNTER: ICD-10-CM

## 2024-11-18 PROCEDURE — 3008F BODY MASS INDEX DOCD: CPT | Performed by: ORTHOPAEDIC SURGERY

## 2024-11-18 PROCEDURE — 72170 X-RAY EXAM OF PELVIS: CPT

## 2024-11-18 PROCEDURE — 1125F AMNT PAIN NOTED PAIN PRSNT: CPT | Performed by: ORTHOPAEDIC SURGERY

## 2024-11-18 PROCEDURE — 1123F ACP DISCUSS/DSCN MKR DOCD: CPT | Performed by: ORTHOPAEDIC SURGERY

## 2024-11-18 PROCEDURE — 1159F MED LIST DOCD IN RCRD: CPT | Performed by: ORTHOPAEDIC SURGERY

## 2024-11-18 PROCEDURE — 72170 X-RAY EXAM OF PELVIS: CPT | Performed by: RADIOLOGY

## 2024-11-18 PROCEDURE — 99204 OFFICE O/P NEW MOD 45 MIN: CPT | Performed by: ORTHOPAEDIC SURGERY

## 2024-11-18 ASSESSMENT — PAIN - FUNCTIONAL ASSESSMENT: PAIN_FUNCTIONAL_ASSESSMENT: 0-10

## 2024-11-18 ASSESSMENT — PAIN SCALES - GENERAL: PAINLEVEL_OUTOF10: 4

## 2024-11-18 NOTE — PROGRESS NOTES
PRIMARY CARE PHYSICIAN: Bijan Land MD  REFERRING PROVIDER: No referring provider defined for this encounter.     CONSULT ORTHOPAEDIC: Hip Evaluation        ASSESSMENT & PLAN    IMPRESSION:  1.  Strain of right hip flexor    PLAN:  Discussed with patient findings above and reviewed her current x-rays with her.  She does have mild osteoarthritis which is asymptomatic on her examination I do think the vast majority of her symptoms are secondary to weakness and a strain to her right hip flexor.  Did review that this could also be causing some referred back pain and would recommend working with physical therapy for this current condition.  We did give her a new order for therapy today.  If she fails to improve or worsens next few weeks may consider an MRI but do not see any sort of red flag symptoms at this time to proceed with advanced imaging at after this appointment.  May continue the anti-inflammatories as needed, will follow-up if she fails to improve.      SUBJECTIVE  CHIEF COMPLAINT:   Chief Complaint   Patient presents with    Right Hip - Pain        HPI: Karyna Noguera is a 64 y.o. patient. Karyna Noguera has had progressive problems with the right hip over the past 2 months. They do not report any trauma. They do not report any constant or progressive numbness or tingling in their legs. Their symptoms are interfering with activities which include pain when crossing her legs or bending to put her shoes on.  Her pain is on the lateral side and into her groin.  She does have pain getting up from sitting.  She does have some low back pain that goes into her butt and down her whole leg.      FUNCTIONAL STATUS: not limited.  AMBULATORY STATUS:  independent   PREVIOUS TREATMENTS: NSAIDS Naproxen  with mild improvement  Therapy for 2-3 weeks  still taking with little improvement   HISTORY OF SURGERY ON AFFECTED HIP(S): No   BACK PAIN REPORTED: Yes       REVIEW OF SYSTEMS  Constitutional: See HPI for pain  assessment, No significant weight loss, recent trauma  Cardiovascular: No chest pain, shortness of breath  Respiratory: No difficulty breathing, cough  Gastrointestinal: No nausea, vomiting, diarrhea, constipation  Musculoskeletal: Noted in HPI, positive for pain, restricted motion, stiffness  Integumentary: No rashes, easy bruising, redness   Neurological: no numbness or tingling in extremities, no gait disturbances   Psychiatric: No mood changes, memory changes, social issues  Heme/Lymph: no excessive swelling, easy bruising, excessive bleeding  ENT: No hearing changes  Eyes: No vision changes    Past Medical History:   Diagnosis Date    Achilles tendinitis, unspecified leg 06/26/2019    Achilles tendon pain    Other abnormal glucose 11/18/2018    Elevated glucose    Other shoulder lesions, right shoulder 08/12/2019    Right rotator cuff tendinitis    Pain in right shoulder 07/15/2019    Right shoulder pain    Personal history of other diseases of the female genital tract     History of endometriosis    Strain of unspecified muscle, fascia and tendon at shoulder and upper arm level, right arm, initial encounter 07/15/2019    Strain of shoulder, right        No Known Allergies     Past Surgical History:   Procedure Laterality Date    APPENDECTOMY  05/21/2018    Appendectomy    HYSTERECTOMY  05/21/2018    Hysterectomy    OTHER SURGICAL HISTORY  09/20/2021    Laparoscopy    REFRACTIVE SURGERY  06/13/2018    Corneal LASIK    TONSILLECTOMY  05/21/2018    Tonsillectomy        Family History   Problem Relation Name Age of Onset    Stomach cancer Mother      Colon cancer Brother          Social History     Socioeconomic History    Marital status:      Spouse name: Not on file    Number of children: Not on file    Years of education: Not on file    Highest education level: Not on file   Occupational History    Not on file   Tobacco Use    Smoking status: Former     Current packs/day: 0.00     Average packs/day: 1.5  packs/day for 10.0 years (15.0 ttl pk-yrs)     Types: Cigarettes     Start date:      Quit date:      Years since quittin.9    Smokeless tobacco: Never   Substance and Sexual Activity    Alcohol use: Yes    Drug use: Not Currently    Sexual activity: Not on file   Other Topics Concern    Not on file   Social History Narrative    Not on file     Social Drivers of Health     Financial Resource Strain: Not on file   Food Insecurity: Not on file   Transportation Needs: Not on file   Physical Activity: Not on file   Stress: Not on file   Social Connections: Not on file   Intimate Partner Violence: Not on file   Housing Stability: Not on file        CURRENT MEDICATIONS:   Current Outpatient Medications   Medication Sig Dispense Refill    amLODIPine (Norvasc) 5 mg tablet Take by mouth once daily. Only takes in winter      atorvastatin (Lipitor) 80 mg tablet Take 1 tablet (80 mg) by mouth once daily at bedtime. 90 tablet 3    calcium carbonate-vit D3-min 600 mg calcium- 400 unit tablet Take 2 tablets by mouth once daily.      cholecalciferol (Vitamin D-3) 50 MCG (2000 UT) tablet Take 1 tablet (2,000 Units) by mouth once daily.      fish oil concentrate (Omega-3) 120-180 mg capsule Take 2 capsules (2 g) by mouth once daily.      methocarbamol (Robaxin) 500 mg tablet Take 1 tablet (500 mg) by mouth 4 times a day as needed for muscle spasms. 30 tablet 0    multivitamin with minerals (Adults Multivitamin) tablet Take 1 tablet by mouth once daily.      omeprazole (PriLOSEC) 40 mg DR capsule Take 1 capsule (40 mg) by mouth once daily in the morning. Take before meals. Do not crush or chew. 30 capsule 0    vitamin E acid succinate (vitamin E succinate) 268 mg (400 unit) tablet Take 2 tablets by mouth once daily.       No current facility-administered medications for this visit.        OBJECTIVE    PHYSICAL EXAM      2022     4:38 PM 2023     9:30 AM 2023     9:57 AM 10/9/2023    10:31 AM 2024     9:37  "AM 6/4/2024    12:49 PM 9/26/2024     8:46 AM   Vitals   Systolic 115  120 119  105 111   Diastolic 75  80 75  70 71   Heart Rate 66 59 59 69 59 59 69   Resp    16   16   Height (in)  1.651 m (5' 5\")  1.651 m (5' 5\") 1.651 m (5' 5\")  1.651 m (5' 5\")   Weight (lb)  173.04  173 180  182   BMI  28.8 kg/m2  28.79 kg/m2 29.95 kg/m2  30.29 kg/m2   BSA (m2)  1.9 m2  1.9 m2 1.93 m2  1.95 m2   Visit Report    Report Report Report Report      There is no height or weight on file to calculate BMI.    GENERAL: A/Ox3, NAD. Appears healthy, well nourished  PSYCHIATRIC: Mood stable, appropriate memory recall  EYES: EOM intact, no scleral icterus  CARDIAC: regular rate  LUNGS: Breathing non-labored  SKIN: no erythema, rashes, or ecchymoses     MUSCULOSKELETAL:  Laterality: right Hip Exam  - ROM, Extension: Full, no flexion contracture, when lying supine with a stretch with her leg on extension and some reproduction of pain at the anterior aspect of the hip in the groin  - Strength: Abduction 5/5, Flexion 4-/5 limited by pain with positive Stinchfield  - Palpation: Nontender along hip  - Log roll/IR exam: Good motion, nonpainful  - Straight leg raise: Negative  - EHL/PF/DF motor intact  - Gait: Normal, no assistive device    NEUROVASCULAR:  - Neurovascular Status: sensation intact to light touch distally  - Capillary refill brisk at extremities, Bilateral dorsalis pedis pulse 2+           IMAGING:  Multiple views of the affected right hip(s) demonstrate: Mild joint space narrowing and minimal arthritic changes.   X-rays were personally reviewed and interpreted by me.  Radiology reports were reviewed by me as well, if readily available at the time.        Devante Ríos DO  Attending Surgeon  Joint Replacement and Adult Reconstructive Surgery  [place], [place]                         "

## 2024-11-22 ENCOUNTER — TREATMENT (OUTPATIENT)
Dept: PHYSICAL THERAPY | Facility: CLINIC | Age: 64
End: 2024-11-22
Payer: MEDICARE

## 2024-11-22 DIAGNOSIS — M79.604 PAIN OF RIGHT LOWER EXTREMITY: ICD-10-CM

## 2024-11-22 PROCEDURE — 97110 THERAPEUTIC EXERCISES: CPT | Mod: GP | Performed by: PHYSICAL THERAPIST

## 2024-11-22 NOTE — PROGRESS NOTES
"Physical Therapy Treatment    Patient Name: Karyna Noguera  MRN: [id number]  Today's Date: 11/22/2024   Visit 5/MN     1. Pain of right lower extremity  Follow Up In Physical Therapy            PRECAUTIONS:  Fall Risk: Low    SUBJECTIVE:  Patient reports that her symptoms seem no different since last week. She still has a sharp pain in the (R) hip upon stand from a seated position. The open-book exercises still help quite a bit but the sideglides seem to be worsening pain. Difficulty sitting on firm surfaces.  Pain level: 3/10 dull ache (R) thigh; max pain 9/10 \"electrical or firework, spasm\"  Compliant with HEP? Yes    OBJECTIVE:  Sharp (R) groin pain with (R) passive hip flexion past 90 degrees, passive abduction past 30 degrees, passive ER @90  + c-sign  No pain with passive (R) hip flexor stretch  No palpable pain to the (R)    TREATMENT:  - Therapeutic Exercise:  Supine (R) hip flexion stretch x60sec  Supine SLR with min assist 2x10  Supine DL bridge 3x10  Hooklying hip ABD DANNA x20  Hooklying hip ADD DANNA x20  (L) S/L, (R) hip abduction x10, x5  (L) S/L, (R)LE clamshells x20  Sit to stands from elevated table 2x10  RLE SL balance 30\"x3  R hip extensions in standing 2x10    ASSESSMENT:  Patient's (R) radicular symptoms seemingly improved as compared to start of therapy. Unfortunately, she is still suffering from significant sharp pain in the (R) hip joint which could be indicative of labral injury. Continued therapy will attempt to improve strength surrounding the (R) hip to allow for improved stability and decreased pain.    EDUCATION:      PLAN:   Slowly progress strength of the (R)LE and avoid pain provoking motions to allow for improved functional activity.    "

## 2024-11-27 ENCOUNTER — TREATMENT (OUTPATIENT)
Dept: PHYSICAL THERAPY | Facility: CLINIC | Age: 64
End: 2024-11-27
Payer: MEDICARE

## 2024-11-27 DIAGNOSIS — M79.604 PAIN OF RIGHT LOWER EXTREMITY: ICD-10-CM

## 2024-11-27 PROCEDURE — 97110 THERAPEUTIC EXERCISES: CPT | Mod: GP | Performed by: PHYSICAL THERAPIST

## 2024-11-27 NOTE — PROGRESS NOTES
"Physical Therapy Treatment    Patient Name: Karyna Noguera  MRN: [id number]  Today's Date: 11/27/2024   Visit 6/MN  Time Calculation  Start Time: 1415  Stop Time: 1500  Time Calculation (min): 45 min  1. Pain of right lower extremity  Follow Up In Physical Therapy            PRECAUTIONS:  Fall Risk: Low    SUBJECTIVE:  Patient reports that she has been more active lately including cooking and shopping. This has resulted in slight increase in \"catching\" sensation in the (R) hip. Overall, seems to be doing slightly better. Prolonged sitting seems to be more of a dull stabbing as opposed to sharp pain.  Pain level: 3/10 dull ache (R) thigh; max pain 9/10 \"electrical or firework, spasm\"  Compliant with HEP? Yes    OBJECTIVE:  Sharp (R) groin pain with (R) passive hip flexion past 90 degrees, passive abduction past 30 degrees, passive ER @90  + c-sign  No pain with passive (R) hip flexor stretch  No palpable pain to the (R)    TREATMENT:  - Therapeutic Exercise:  Rec Bike L5 x 5 mins  Supine (R) hip flexion stretch x60sec  Supine SLR with min assist 2x10  Supine DL bridge 3x10  Hooklying hip ABD DANNA x20  Hooklying hip ADD DANNA x20  (L) S/L, (R) hip abduction x10, x5  (L) S/L, (R)LE clamshells x20  Sit to stands from elevated table 2x10  RLE SL balance 30\"x3  R hip extensions in standing 2x10    - Manual Therapy:  PROM (R) hip in all planes - avoiding painful end ranges    ASSESSMENT:  Patient with persistent (R) hip pain, which seems intra-articular in nature. Ongoing therapy will target strength/stabilization of the (R) hip to decreased pressure on the joint and improve symptoms.    EDUCATION:    PLAN:   Trial standing marches next visit.  Increase HEP.    "

## 2024-12-04 ENCOUNTER — TREATMENT (OUTPATIENT)
Dept: PHYSICAL THERAPY | Facility: CLINIC | Age: 64
End: 2024-12-04
Payer: MEDICARE

## 2024-12-04 DIAGNOSIS — M79.604 PAIN OF RIGHT LOWER EXTREMITY: Primary | ICD-10-CM

## 2024-12-04 PROCEDURE — 97140 MANUAL THERAPY 1/> REGIONS: CPT | Mod: GP,CQ | Performed by: PHYSICAL THERAPY ASSISTANT

## 2024-12-04 PROCEDURE — 97110 THERAPEUTIC EXERCISES: CPT | Mod: GP,CQ | Performed by: PHYSICAL THERAPY ASSISTANT

## 2024-12-04 NOTE — PROGRESS NOTES
"Physical Therapy Treatment    Patient Name: Karyna Noguera  MRN: [id number]  Today's Date: 12/4/2024   Visit 7/MN  Time Calculation  Start Time: 0950  Stop Time: 1035  Time Calculation (min): 45 min  1. Pain of right lower extremity  Follow Up In Physical Therapy        PRECAUTIONS:  Fall Risk: Low    SUBJECTIVE:  Pt enters into therapy reporting cont laboring pain in her R inner groin region that migrates down her inner thigh. She reports pain with chair transfers and walking.   Pain level: 3-9/10   Compliant with HEP? Yes    OBJECTIVE:  Cont to amb with antalgia with decreased stance time and push off on R foot.     TREATMENT:  - Therapeutic Exercise:  Rec Bike L5 x 5 mins  Supine (R) hip flexion stretch x60sec  Supine SLR with mod assist 2x10  Supine DL bridge 3x10  Hooklying hip ABD DANNA x20  Hooklying hip ADD DANNA x20  (L) S/L, (R) hip abduction x10, 1/2 ROM, modA  (L) S/L, (R)LE clamshells 4x5  Sit to stands from elevated table 2x10  RLE SL balance 30\"x1, 10\"x1- pain   R hip extensions in standing 2x10    - Manual Therapy:  L hip distractions with belt, grade 2, x10'    ASSESSMENT:  L hip/groin pain remains problematic. Had difficulty performing primarily leg raises on the table which I assisted.  PT so has not been beneficial in reducing pain and improving function. Would benefit with further diagnostics for potential underlying pathology.    EDUCATION:  NP  PLAN:   Trial standing marches next visit. (Attempt next visit if lyssa)  Increase HEP.    "

## 2024-12-10 ENCOUNTER — TREATMENT (OUTPATIENT)
Dept: PHYSICAL THERAPY | Facility: CLINIC | Age: 64
End: 2024-12-10
Payer: MEDICARE

## 2024-12-10 DIAGNOSIS — M79.604 PAIN OF RIGHT LOWER EXTREMITY: Primary | ICD-10-CM

## 2024-12-10 PROCEDURE — 97110 THERAPEUTIC EXERCISES: CPT | Mod: GP,CQ | Performed by: PHYSICAL THERAPY ASSISTANT

## 2024-12-10 NOTE — PROGRESS NOTES
"Physical Therapy Treatment    Patient Name: Karyna Noguera  MRN: [id number]  Today's Date: 12/10/2024   Visit 8/MN  Time Calculation  Start Time: 1030  Stop Time: 1115  Time Calculation (min): 45 min  1. Pain of right lower extremity  Follow Up In Physical Therapy          PRECAUTIONS:  Fall Risk: Low    SUBJECTIVE:  Pt reports standing for long periods can greatly increase pain and sitting for long periods. She says as the day progresses pain gets worse. This morning pain isnt too bad.   Pain level: 2-9/10   Compliant with HEP? Yes    OBJECTIVE:  Cont to amb with antalgia with decreased stance time and push off on R foot.   Can only lyssa 1/2 ROM with stand hip abd  Guarding with table transfers.    TREATMENT:  - Therapeutic Exercise:  Rec Bike L5 x 7 mins  HS/hip flex str steps 30\"x3  R hip extensions in standing 2x12  Standing hip abd R/L 2x15ea  Supine (R) hip flexion stretch x60sec  Supine SLR with mod assist 2x12  Supine DL bridge 3x12  Hooklying hip ABD DANNA x20  Hooklying hip ADD DANNA 5\"x20  H/L hip abd RTB 2x10  (L) S/L, (R) hip abduction 2x10, 1/2 ROM, modA  (L) S/L, (R)LE clamshells RTB 2x10  Sit to stands from elevated table 2x10-np         - Manual Therapy:  NP    ASSESSMENT:  R hip pain cont to be problematic. Performed exercises slowly and cautiously. She has scheduled injection for next Mon @ 11:45 and follows up with therapy on Weds.    EDUCATION:  NP  PLAN:   Trial standing marches next visit. (Attempt next visit if lyssa)  Increase HEP.    "

## 2024-12-16 ENCOUNTER — APPOINTMENT (OUTPATIENT)
Facility: CLINIC | Age: 64
End: 2024-12-16
Payer: MEDICARE

## 2024-12-17 ENCOUNTER — OFFICE VISIT (OUTPATIENT)
Dept: ORTHOPEDIC SURGERY | Facility: CLINIC | Age: 64
End: 2024-12-17
Payer: MEDICARE

## 2024-12-17 ENCOUNTER — HOSPITAL ENCOUNTER (OUTPATIENT)
Dept: RADIOLOGY | Facility: EXTERNAL LOCATION | Age: 64
Discharge: HOME | End: 2024-12-17

## 2024-12-17 VITALS — HEIGHT: 65 IN | WEIGHT: 185 LBS | BODY MASS INDEX: 30.82 KG/M2

## 2024-12-17 DIAGNOSIS — M16.11 PRIMARY OSTEOARTHRITIS OF RIGHT HIP: Primary | ICD-10-CM

## 2024-12-17 DIAGNOSIS — S76.011A STRAIN OF HIP FLEXOR, RIGHT, INITIAL ENCOUNTER: ICD-10-CM

## 2024-12-17 PROCEDURE — 20611 DRAIN/INJ JOINT/BURSA W/US: CPT | Performed by: EMERGENCY MEDICINE

## 2024-12-17 PROCEDURE — 99204 OFFICE O/P NEW MOD 45 MIN: CPT | Performed by: EMERGENCY MEDICINE

## 2024-12-17 PROCEDURE — 1036F TOBACCO NON-USER: CPT | Performed by: EMERGENCY MEDICINE

## 2024-12-17 PROCEDURE — 1123F ACP DISCUSS/DSCN MKR DOCD: CPT | Performed by: EMERGENCY MEDICINE

## 2024-12-17 PROCEDURE — 3008F BODY MASS INDEX DOCD: CPT | Performed by: EMERGENCY MEDICINE

## 2024-12-17 PROCEDURE — 1125F AMNT PAIN NOTED PAIN PRSNT: CPT | Performed by: EMERGENCY MEDICINE

## 2024-12-17 PROCEDURE — 1159F MED LIST DOCD IN RCRD: CPT | Performed by: EMERGENCY MEDICINE

## 2024-12-17 RX ORDER — METHYLPREDNISOLONE ACETATE 40 MG/ML
80 INJECTION, SUSPENSION INTRA-ARTICULAR; INTRALESIONAL; INTRAMUSCULAR; SOFT TISSUE
Status: COMPLETED | OUTPATIENT
Start: 2024-12-17 | End: 2024-12-17

## 2024-12-17 RX ORDER — LIDOCAINE HYDROCHLORIDE 10 MG/ML
4 INJECTION, SOLUTION INFILTRATION; PERINEURAL
Status: COMPLETED | OUTPATIENT
Start: 2024-12-17 | End: 2024-12-17

## 2024-12-17 ASSESSMENT — PAIN SCALES - GENERAL: PAINLEVEL_OUTOF10: 2

## 2024-12-17 ASSESSMENT — PAIN - FUNCTIONAL ASSESSMENT: PAIN_FUNCTIONAL_ASSESSMENT: 0-10

## 2024-12-17 NOTE — PROGRESS NOTES
Subjective    Patient ID: Karyna Noguera is a 64 y.o. female.    Chief Complaint: Pain of the Right Hip (Progressive problems for the past 3 months. No Hx of trauma, Sx, or injections to the area. Reports some numbness/tingling. pain when crossing her legs or bending to put her shoes on.  Her pain is on the lateral side and into her groin.  She does have pain getting up from sitting. Pain has improved some. She does have some low back pain that goes into her butt and down her whole leg.  Has taken Naproxen and done PT with mild improvement./)     Last Surgery: No surgery found  Last Surgery Date: No surgery found    Karyna is a 64-year-old female coming in with acute on chronic right hip pain for the past 3 months without any history of traumatic events or known injuries.  She was seen by one of my surgical partners, Dr. Ríos, who referred her to physical therapy for some possible hip flexor tendinitis or a hip flexor strain.  She also has some known right hip osteoarthritis and after speaking with her physical therapist, he and Dr. Ríos believes that she may benefit from a right hip joint injection.  She is coming in with moderate pain that is worse with activity and slightly better with rest.  She has trouble with hip flexion.  The pain is located in the right anterior groin area.  She has not had an injection in the hip before but has had cortisone in her knee I believe.  She has taken naproxen and is doing physical therapy with mild improvement.        Objective   Right Hip Exam     Tenderness   The patient is experiencing no tenderness.     Range of Motion   Abduction:  abnormal   Adduction:  normal   Flexion:  abnormal   External rotation:  abnormal   Internal rotation:  abnormal     Muscle Strength   Abduction: 5/5   Adduction: 5/5   Flexion: 4/5     Tests   VIVEK: positive    Other   Erythema: absent  Sensation: normal  Pulse: present    Comments:  +fadir      Left Hip Exam   Left hip exam is  normal.            Image Results:  Point of Care Ultrasound  These images are not reportable by radiology and will not be interpreted   by  Radiologists.    X-rays of the right hip were reviewed and interpreted by me on 12/17/2024 and revealed some mild arthritis of the right hip joint with some bone spurring but no evidence of acute injury or fracture.    Patient ID: Karyna Noguera is a 64 y.o. female.    L Inj/Asp: R hip joint on 12/17/2024 2:22 PM  Indications: pain  Details: 20 G needle, ultrasound-guided anterior approach  Medications: 80 mg methylPREDNISolone acetate 40 mg/mL; 4 mL lidocaine 10 mg/mL (1 %)  Outcome: tolerated well, no immediate complications  Procedure, treatment alternatives, risks and benefits explained, specific risks discussed. Consent was given by the patient. Immediately prior to procedure a time out was called to verify the correct patient, procedure, equipment, support staff and site/side marked as required. Patient was prepped and draped in the usual sterile fashion.           Assessment/Plan   Encounter Diagnoses:  Primary osteoarthritis of right hip    Strain of hip flexor, right, initial encounter    Orders Placed This Encounter    L Inj/Asp    L Inj/Asp    Point of Care Ultrasound     No follow-ups on file.    We discussed her treatment options and agreed to perform a right hip ultrasound-guided cortisone injection today in the office.  The patient tolerated the procedure well without any complications and activity modifications were reviewed.  She is going to continue physical therapy with her home exercises and will follow-up with me in about a month and if she is not better at that time we could potentially do a right hip flexor cortisone injection. Referred by Dr. Ríos.     ** Please excuse any errors in grammar or translation related to this dictation. Voice recognition software was utilized to prepare this document. **       Pavel Lyeva MD  [facility]  Sports Medicine

## 2024-12-18 ENCOUNTER — APPOINTMENT (OUTPATIENT)
Dept: PHYSICAL THERAPY | Facility: CLINIC | Age: 64
End: 2024-12-18
Payer: MEDICARE

## 2024-12-24 ENCOUNTER — TREATMENT (OUTPATIENT)
Dept: PHYSICAL THERAPY | Facility: CLINIC | Age: 64
End: 2024-12-24
Payer: MEDICARE

## 2024-12-24 DIAGNOSIS — M79.604 PAIN OF RIGHT LOWER EXTREMITY: ICD-10-CM

## 2024-12-24 PROCEDURE — 97110 THERAPEUTIC EXERCISES: CPT | Mod: GP | Performed by: PHYSICAL THERAPIST

## 2024-12-24 NOTE — PROGRESS NOTES
"Physical Therapy Treatment    Patient Name: Karyna Noguera  MRN: [id number]  Today's Date: 12/24/2024   Visit 9/MN  Time Calculation  Start Time: 0904  Stop Time: 0943  Time Calculation (min): 39 min  1. Pain of right lower extremity  Follow Up In Physical Therapy          PRECAUTIONS:  Fall Risk: Low    SUBJECTIVE:  Pt states that she received her cortisone injection last week and it has resolved most of her pain. She is walking more normally and able to move with much less frequent sharp pains. Overall, she is very pleased with her current status.  Pain level: 2-4/10   Compliant with HEP? Yes    OBJECTIVE:  (R) SLR test: minor soreness anterior hip  (R) hip MMT:   ABD: 3+/5   ER: 4/5    TREATMENT:  - Therapeutic Exercise:  Upright Bike L1 x 8 mins  Hip flex str steps 30\"x3  Standing hip abd/ext RTB 2x10  Supine SLR 2x10  Supine SL bridge 3x10  (L) S/L, (R) hip abduction 2x10  (L) S/L, (R)LE clamshells RTB 2x10    ASSESSMENT:  Patient with full relief of pain with injection suggesting pain was due to intra-articular origin. Weakness still prevalent in the lateral/posterior (R) hip musculature, which should be address for full resolution of symptoms.    EDUCATION:  Access Code: [email or web address]  URL: [email or web address]  Date: 12/24/2024  Prepared by: Polo Sr    Exercises  - Figure 4 Bridge (Mirrored)  - 1 x daily - 7 x weekly - 3 sets - 10 reps  - Standing Hip Extension Kicks  - 1 x daily - 7 x weekly - 3 sets - 10 reps  - Standing Hip Abduction Kicks  - 1 x daily - 7 x weekly - 3 sets - 10 reps  - Sidelying Hip Abduction  - 1 x daily - 7 x weekly - 3 sets - 10 reps  - Clam with Resistance  - 1 x daily - 7 x weekly - 3 sets - 10 reps    PLAN:   Follow-up every 2 weeks for HEP progression.      "

## 2025-01-08 ENCOUNTER — TREATMENT (OUTPATIENT)
Dept: PHYSICAL THERAPY | Facility: CLINIC | Age: 65
End: 2025-01-08
Payer: MEDICARE

## 2025-01-08 DIAGNOSIS — M79.604 PAIN OF RIGHT LOWER EXTREMITY: ICD-10-CM

## 2025-01-08 PROCEDURE — 97110 THERAPEUTIC EXERCISES: CPT | Mod: GP | Performed by: PHYSICAL THERAPIST

## 2025-01-08 NOTE — PROGRESS NOTES
"Physical Therapy Treatment    Patient Name: Karyna Noguera  MRN: [id number]  Today's Date: 1/8/2025   Visit 10/MN  Time Calculation  Start Time: 0935  Stop Time: 1016  Time Calculation (min): 41 min  1. Pain of right lower extremity  Follow Up In Physical Therapy          PRECAUTIONS:  Fall Risk: Low    SUBJECTIVE:  Pt reports that her hip pain is no longer a pain but more of an ache. She will still get a sharper pain when attempting to cross the RLE over the LLE or twisting the RLE. Also will set some pain radiating into the lateral (R) thigh when laying in bed at night.  Pain level: 2-4/10   Compliant with HEP? Yes    OBJECTIVE:  (R) hip MMT:   ABD: 4/5   ER: 4+/5    TREATMENT:  - Therapeutic Exercise:  Upright Bike L1 x 8 mins  Hip flex str RLE steps 20\"x5  Lateral step ups 8 inch 2x12  Trae taps to 12 inch 2x10 on RLE  Standing hip abd/ext RTB 2x12  Supine SLR 2x10  Supine SL bridge 3x10  (L) S/L, (R) hip abduction 2x12  (L) S/L, (R)LE clamshells RTB 2x10    ASSESSMENT:  Patient tolerated treatment well. Her consistency with HEP has allowed for improvement in strength and function of the RLE. Ongoing exercise will further aid in stabilization of the hip for return to prior level of function.    EDUCATION:  Access Code: [email or web address]  URL: [email or web address]  Date: 01/08/2025  Prepared by: Polo rS    Exercises  - Figure 4 Bridge (Mirrored)  - 1 x daily - 7 x weekly - 3 sets - 10 reps  - Standing Hip Extension Kicks  - 1 x daily - 7 x weekly - 3 sets - 10 reps  - Standing Hip Abduction Kicks  - 1 x daily - 7 x weekly - 3 sets - 10 reps  - Sidelying Hip Abduction  - 1 x daily - 7 x weekly - 3 sets - 10 reps  - Clam with Resistance  - 1 x daily - 7 x weekly - 3 sets - 10 reps  - Lateral Step Up  - 1 x daily - 7 x weekly - 3 sets - 10 reps  - Single Leg Cone Touch  - 1 x daily - 7 x weekly - 3 sets - 10 reps    PLAN:   Follow-up in 2 weeks for final HEP progression and DC with potential return " to MD for future injection.

## 2025-01-14 ENCOUNTER — APPOINTMENT (OUTPATIENT)
Dept: ORTHOPEDIC SURGERY | Facility: CLINIC | Age: 65
End: 2025-01-14
Payer: MEDICARE

## 2025-01-14 ENCOUNTER — HOSPITAL ENCOUNTER (OUTPATIENT)
Dept: RADIOLOGY | Facility: EXTERNAL LOCATION | Age: 65
Discharge: HOME | End: 2025-01-14

## 2025-01-14 VITALS — WEIGHT: 180 LBS | HEIGHT: 65 IN | BODY MASS INDEX: 29.99 KG/M2

## 2025-01-14 DIAGNOSIS — M76.01 GLUTEAL TENDINITIS OF RIGHT BUTTOCK: ICD-10-CM

## 2025-01-14 DIAGNOSIS — M16.11 PRIMARY OSTEOARTHRITIS OF RIGHT HIP: Primary | ICD-10-CM

## 2025-01-14 PROCEDURE — 1036F TOBACCO NON-USER: CPT | Performed by: EMERGENCY MEDICINE

## 2025-01-14 PROCEDURE — 20551 NJX 1 TENDON ORIGIN/INSJ: CPT | Performed by: EMERGENCY MEDICINE

## 2025-01-14 PROCEDURE — 76942 ECHO GUIDE FOR BIOPSY: CPT | Performed by: EMERGENCY MEDICINE

## 2025-01-14 PROCEDURE — 1123F ACP DISCUSS/DSCN MKR DOCD: CPT | Performed by: EMERGENCY MEDICINE

## 2025-01-14 PROCEDURE — 3008F BODY MASS INDEX DOCD: CPT | Performed by: EMERGENCY MEDICINE

## 2025-01-14 PROCEDURE — 1159F MED LIST DOCD IN RCRD: CPT | Performed by: EMERGENCY MEDICINE

## 2025-01-14 PROCEDURE — 1125F AMNT PAIN NOTED PAIN PRSNT: CPT | Performed by: EMERGENCY MEDICINE

## 2025-01-14 PROCEDURE — 99214 OFFICE O/P EST MOD 30 MIN: CPT | Performed by: EMERGENCY MEDICINE

## 2025-01-14 RX ORDER — LIDOCAINE HYDROCHLORIDE 10 MG/ML
4 INJECTION, SOLUTION INFILTRATION; PERINEURAL
Status: COMPLETED | OUTPATIENT
Start: 2025-01-14 | End: 2025-01-14

## 2025-01-14 RX ORDER — TRIAMCINOLONE ACETONIDE 40 MG/ML
80 INJECTION, SUSPENSION INTRA-ARTICULAR; INTRAMUSCULAR
Status: COMPLETED | OUTPATIENT
Start: 2025-01-14 | End: 2025-01-14

## 2025-01-14 RX ADMIN — LIDOCAINE HYDROCHLORIDE 4 ML: 10 INJECTION, SOLUTION INFILTRATION; PERINEURAL at 11:59

## 2025-01-14 RX ADMIN — TRIAMCINOLONE ACETONIDE 80 MG: 40 INJECTION, SUSPENSION INTRA-ARTICULAR; INTRAMUSCULAR at 11:59

## 2025-01-14 ASSESSMENT — PAIN - FUNCTIONAL ASSESSMENT: PAIN_FUNCTIONAL_ASSESSMENT: 0-10

## 2025-01-14 ASSESSMENT — PAIN SCALES - GENERAL: PAINLEVEL_OUTOF10: 1

## 2025-01-14 NOTE — PROGRESS NOTES
Subjective    Patient ID: Karyna Noguera is a 64 y.o. female.    Chief Complaint: Pain of the Right Hip (R hip CSI done 12/17/2024. She has no more groin pain, but still has lateral sided groin pain which goes down the leg. Pain when flexing or externally rotating the hip. She has 1 more session of PT in two week and is doing at home exercises. )     Last Surgery: No surgery found  Last Surgery Date: No surgery found    Karyna is a 64-year-old female coming in with acute on chronic right hip pain for the past 3 months without any history of traumatic events or known injuries.  She was seen by one of my surgical partners, Dr. Ríos, who referred her to physical therapy for some possible hip flexor tendinitis or a hip flexor strain.  She also has some known right hip osteoarthritis and after speaking with her physical therapist, he and Dr. Ríos believes that she may benefit from a right hip joint injection.  She is coming in with moderate pain that is worse with activity and slightly better with rest.  She has trouble with hip flexion.  The pain is located in the right anterior groin area.  She has not had an injection in the hip before but has had cortisone in her knee I believe.  She has taken naproxen and is doing physical therapy with mild improvement. We agreed to perform a right hip ultrasound-guided cortisone injection today in the office.  The patient tolerated the procedure well without any complications and activity modifications were reviewed.  She is going to continue physical therapy with her home exercises and will follow-up with me in about a month and if she is not better at that time we could potentially do a right hip flexor cortisone injection. Referred by Dr. Ríos.     Update on 1/14/2025.  Karyna is coming back in for a follow-up visit.  She has responded very well to the right hip cortisone injection that we did on 12/17/2024.  She states that her groin pain is completely gone and that the pain  that she was getting from her arthritis seems to have improved by at least 95%.  She is still taking Aleve twice daily.  Her complaint today is that she is having a decent amount of lateral hip pain near her greater trochanter.  Again she was limping pretty severely prior to the injection that we did in December.  No trauma.  She was able to bowl once earlier this week which makes her extremely happy.  No infectious symptoms.  No recent traumatic events or known injuries.  No other complaints or today.        Objective   Right Hip Exam     Tenderness   The patient is experiencing tenderness in the greater trochanter.    Range of Motion   Abduction:  abnormal   Adduction:  normal   Flexion:  abnormal   External rotation:  abnormal   Internal rotation:  abnormal Right hip internal rotation: Range of motion is still limited but her pain with testing has improved.    Muscle Strength   Abduction: 5/5   Adduction: 5/5   Flexion: 4/5     Tests   VIVEK: positive    Other   Erythema: absent  Sensation: normal  Pulse: present    Comments:  +fadir  Exam grossly unchanged, less pain with testing except she does have some tenderness over the right greater trochanter      Left Hip Exam   Left hip exam is normal.            Image Results:  Point of Care Ultrasound  These images are not reportable by radiology and will not be interpreted   by  Radiologists.    No new images today    Patient ID: Karyna Noguera is a 64 y.o. female.    Tendon Sheath Injection (Right gluteal tendon sheath) on 1/14/2025 11:59 AM  Indications: pain  Details: 20 G needle, ultrasound-guided lateral approach  Medications: 80 mg triamcinolone acetonide 40 mg/mL; 4 mL lidocaine 10 mg/mL (1 %)  Outcome: tolerated well, no immediate complications  Procedure, treatment alternatives, risks and benefits explained, specific risks discussed. Consent was given by the patient. Immediately prior to procedure a time out was called to verify the correct patient,  procedure, equipment, support staff and site/side marked as required. Patient was prepped and draped in the usual sterile fashion.           Assessment/Plan   Encounter Diagnoses:  Primary osteoarthritis of right hip    Gluteal tendinitis of right buttock    Orders Placed This Encounter    Tendon Sheath Injection     No follow-ups on file.    We discussed her treatment options and eventually agreed to do a right gluteal tendon sheath cortisone injection here today in the office under ultrasound guidance.  The patient tolerated the procedure well without any complications and activity modifications were reviewed.  She is going to continue taking Aleve as needed and can also begin doing Tylenol 1000 mg 3 times a day at prescription dosing.  She is going to follow-up with me as needed moving forward and knows that we can repeat these injections as often as every 3 months.    ** Please excuse any errors in grammar or translation related to this dictation. Voice recognition software was utilized to prepare this document. **       Pavel Leyva MD  [facility] Sports Medicine

## 2025-01-24 ENCOUNTER — TREATMENT (OUTPATIENT)
Dept: PHYSICAL THERAPY | Facility: CLINIC | Age: 65
End: 2025-01-24
Payer: MEDICARE

## 2025-01-24 DIAGNOSIS — M79.604 PAIN OF RIGHT LOWER EXTREMITY: ICD-10-CM

## 2025-01-24 PROCEDURE — 97535 SELF CARE MNGMENT TRAINING: CPT | Mod: GP | Performed by: PHYSICAL THERAPIST

## 2025-01-24 NOTE — PROGRESS NOTES
Physical Therapy Discharge    Patient Name: Karyna Noguera  MRN: [id number]  Today's Date: 1/24/2025   Visit 11/MN  Time Calculation  Start Time: 1330  Stop Time: 1340  Time Calculation (min): 10 min  1. Pain of right lower extremity  Follow Up In Physical Therapy          PRECAUTIONS:  Fall Risk: Low    SUBJECTIVE:  Pt saw the sports medicine physician again last week whom provided an injection to the (R) greater trochanter. Patient reports very little, if any, symptoms. She has returned to bowling as well as all other ADLs. Feels ready for discharge.  Pain level: 0-2/10  Compliant with HEP? Yes, sometimes    OBJECTIVE:  (R) hip MMT: 4+/5 in all planes  +scour test (R)  + dial test (R)    TREATMENT:  Re-assessment and DC instructions    ASSESSMENT:  Patient has achieved near full resolution of symptoms with injections. She would benefit from ongoing HEP performance to prevent return of symptoms. She likely has some ongoing internal derangement of the (R) hip for which future injections may be necessary to address.    EDUCATION:  Access Code: [email or web address]    PLAN:   DC to HEP; return to MD if further injection is required  Resolved       PT Problem       PT to exhibit normal gait pattern without evidence of antalgia. (Met)       Start:  10/21/24    Expected End:  12/16/24    Resolved:  01/24/25         Patient Stated Goal: no pain for return to normal activities including bowling. (Met)       Start:  10/21/24    Expected End:  12/16/24    Resolved:  01/24/25         Patient will maintain single leg stand right for >15 sec without loss of balance to indicate return of function. (Met)       Start:  10/21/24    Expected End:  12/16/24    Resolved:  01/24/25         Patient will achieve right hip flexion strength of 4+/5 (Met)       Start:  10/21/24    Expected End:  12/16/24    Resolved:  01/24/25         Patient will demonstrate independence in home program for support of progression (Met)       Start:  10/21/24    Expected  End:  12/16/24    Resolved:  01/24/25         Patient will report max pain of 3/10 demonstrating a reduction of overall pain (Met)       Start:  10/21/24    Expected End:  12/16/24    Resolved:  01/24/25

## 2025-04-07 ENCOUNTER — HOSPITAL ENCOUNTER (OUTPATIENT)
Dept: RADIOLOGY | Facility: CLINIC | Age: 65
Discharge: HOME | End: 2025-04-07
Payer: MEDICARE

## 2025-04-07 ENCOUNTER — APPOINTMENT (OUTPATIENT)
Dept: PRIMARY CARE | Facility: CLINIC | Age: 65
End: 2025-04-07
Payer: MEDICARE

## 2025-04-07 VITALS
BODY MASS INDEX: 30.12 KG/M2 | SYSTOLIC BLOOD PRESSURE: 120 MMHG | TEMPERATURE: 98 F | HEART RATE: 82 BPM | OXYGEN SATURATION: 93 % | WEIGHT: 181 LBS | DIASTOLIC BLOOD PRESSURE: 76 MMHG

## 2025-04-07 DIAGNOSIS — M25.511 ACUTE PAIN OF RIGHT SHOULDER: Primary | ICD-10-CM

## 2025-04-07 DIAGNOSIS — M25.511 ACUTE PAIN OF RIGHT SHOULDER: ICD-10-CM

## 2025-04-07 PROCEDURE — 1125F AMNT PAIN NOTED PAIN PRSNT: CPT | Performed by: FAMILY MEDICINE

## 2025-04-07 PROCEDURE — 73030 X-RAY EXAM OF SHOULDER: CPT | Mod: RIGHT SIDE | Performed by: RADIOLOGY

## 2025-04-07 PROCEDURE — 1123F ACP DISCUSS/DSCN MKR DOCD: CPT | Performed by: FAMILY MEDICINE

## 2025-04-07 PROCEDURE — 99213 OFFICE O/P EST LOW 20 MIN: CPT | Performed by: FAMILY MEDICINE

## 2025-04-07 PROCEDURE — 1159F MED LIST DOCD IN RCRD: CPT | Performed by: FAMILY MEDICINE

## 2025-04-07 PROCEDURE — 73030 X-RAY EXAM OF SHOULDER: CPT | Mod: RT

## 2025-04-07 PROCEDURE — 1036F TOBACCO NON-USER: CPT | Performed by: FAMILY MEDICINE

## 2025-04-07 ASSESSMENT — PAIN SCALES - GENERAL: PAINLEVEL_OUTOF10: 5

## 2025-04-07 NOTE — PROGRESS NOTES
Subjective   Patient ID: Karyna Noguera is a 64 y.o. female who presents for Shoulder Pain (RT SHOULDER PAIN FOR 3 WEEKS).    HPI  Right shoulder pain x 3 wks.  No trauma or increased activity prior to onset, but goes bowling twice/wk.  Felt a snap left anterior shoulder while drying off after a shower 2 wks ago.  Described as burning, stabbing.  Asso w/occ neck stiffness.  Constant.  Worse w/movement.  Improved w/inactivity.    No help w/Aleve or Aspercream.  Declines oral steroid.  Max 10/10.  Ave 4/10.  Now 4/10.    Review of Systems  Still has right hip pain, low back pain.  Had physical therapy, ortho eval, tendon sheath injection.  Will follow up with ortho for continued Sx.    Objective   /76 (BP Location: Left arm, Patient Position: Sitting, BP Cuff Size: Adult)   Pulse 82   Temp 36.7 °C (98 °F) (Temporal)   Wt 82.1 kg (181 lb)   SpO2 93%   BMI 30.12 kg/m²     Physical Exam  Constitutional:       General: She is not in acute distress.     Appearance: She is obese.   Musculoskeletal:      Right shoulder: Tenderness (AC joint) present. Decreased range of motion (unable to flex or abduct > 15 degrees).      Cervical back: No rigidity. Normal range of motion.   Neurological:      Mental Status: She is oriented to person, place, and time.   Psychiatric:         Mood and Affect: Mood normal.         Behavior: Behavior normal.     Assessment/Plan   Diagnoses and all orders for this visit:  Acute pain of right shoulder  -     XR shoulder right 2+ views; Future  -     Referral to Orthopedics and Sports Medicine; Future    X-ray.  OTC Analgesics as needed.  Referred to ortho as for further evaluation.    Schedule annual wellness visit.

## 2025-04-07 NOTE — PATIENT INSTRUCTIONS
X-ray.  OTC Analgesics as needed.  Referred to ortho as for further evaluation.    Schedule annual wellness visit.

## 2025-04-14 ENCOUNTER — APPOINTMENT (OUTPATIENT)
Dept: PRIMARY CARE | Facility: CLINIC | Age: 65
End: 2025-04-14
Payer: MEDICARE

## 2025-04-16 ENCOUNTER — APPOINTMENT (OUTPATIENT)
Dept: PRIMARY CARE | Facility: CLINIC | Age: 65
End: 2025-04-16
Payer: MEDICARE

## 2025-04-16 VITALS
HEIGHT: 66 IN | OXYGEN SATURATION: 94 % | HEART RATE: 75 BPM | WEIGHT: 180 LBS | BODY MASS INDEX: 28.93 KG/M2 | TEMPERATURE: 98 F | SYSTOLIC BLOOD PRESSURE: 122 MMHG | DIASTOLIC BLOOD PRESSURE: 76 MMHG

## 2025-04-16 DIAGNOSIS — R73.03 PREDIABETES: ICD-10-CM

## 2025-04-16 DIAGNOSIS — I20.1 CORONARY ARTERY SPASM: ICD-10-CM

## 2025-04-16 DIAGNOSIS — Z12.31 ENCOUNTER FOR SCREENING MAMMOGRAM FOR MALIGNANT NEOPLASM OF BREAST: ICD-10-CM

## 2025-04-16 DIAGNOSIS — E78.5 HYPERLIPIDEMIA, UNSPECIFIED HYPERLIPIDEMIA TYPE: ICD-10-CM

## 2025-04-16 DIAGNOSIS — I73.00 RAYNAUD'S PHENOMENON WITHOUT GANGRENE: ICD-10-CM

## 2025-04-16 DIAGNOSIS — Z78.0 MENOPAUSE: ICD-10-CM

## 2025-04-16 DIAGNOSIS — Z00.00 INITIAL MEDICARE ANNUAL WELLNESS VISIT: Primary | ICD-10-CM

## 2025-04-16 DIAGNOSIS — K76.0 FATTY LIVER: ICD-10-CM

## 2025-04-16 PROCEDURE — 1123F ACP DISCUSS/DSCN MKR DOCD: CPT | Performed by: FAMILY MEDICINE

## 2025-04-16 PROCEDURE — 3008F BODY MASS INDEX DOCD: CPT | Performed by: FAMILY MEDICINE

## 2025-04-16 PROCEDURE — 1170F FXNL STATUS ASSESSED: CPT | Performed by: FAMILY MEDICINE

## 2025-04-16 PROCEDURE — 1160F RVW MEDS BY RX/DR IN RCRD: CPT | Performed by: FAMILY MEDICINE

## 2025-04-16 PROCEDURE — 1126F AMNT PAIN NOTED NONE PRSNT: CPT | Performed by: FAMILY MEDICINE

## 2025-04-16 PROCEDURE — G0438 PPPS, INITIAL VISIT: HCPCS | Performed by: FAMILY MEDICINE

## 2025-04-16 PROCEDURE — 1159F MED LIST DOCD IN RCRD: CPT | Performed by: FAMILY MEDICINE

## 2025-04-16 PROCEDURE — 1036F TOBACCO NON-USER: CPT | Performed by: FAMILY MEDICINE

## 2025-04-16 ASSESSMENT — PATIENT HEALTH QUESTIONNAIRE - PHQ9
1. LITTLE INTEREST OR PLEASURE IN DOING THINGS: NOT AT ALL
2. FEELING DOWN, DEPRESSED OR HOPELESS: NOT AT ALL
SUM OF ALL RESPONSES TO PHQ9 QUESTIONS 1 AND 2: 0

## 2025-04-16 ASSESSMENT — ACTIVITIES OF DAILY LIVING (ADL)
BATHING: INDEPENDENT
GROCERY_SHOPPING: INDEPENDENT
DRESSING: INDEPENDENT
TAKING_MEDICATION: INDEPENDENT
MANAGING_FINANCES: INDEPENDENT
DOING_HOUSEWORK: INDEPENDENT

## 2025-04-16 ASSESSMENT — PAIN SCALES - GENERAL: PAINLEVEL_OUTOF10: 0-NO PAIN

## 2025-04-16 ASSESSMENT — ANXIETY QUESTIONNAIRES
GAD7 TOTAL SCORE: 0
IF YOU CHECKED OFF ANY PROBLEMS ON THIS QUESTIONNAIRE, HOW DIFFICULT HAVE THESE PROBLEMS MADE IT FOR YOU TO DO YOUR WORK, TAKE CARE OF THINGS AT HOME, OR GET ALONG WITH OTHER PEOPLE: NOT DIFFICULT AT ALL
2. NOT BEING ABLE TO STOP OR CONTROL WORRYING: NOT AT ALL
3. WORRYING TOO MUCH ABOUT DIFFERENT THINGS: NOT AT ALL
7. FEELING AFRAID AS IF SOMETHING AWFUL MIGHT HAPPEN: NOT AT ALL
1. FEELING NERVOUS, ANXIOUS, OR ON EDGE: NOT AT ALL
4. TROUBLE RELAXING: NOT AT ALL
5. BEING SO RESTLESS THAT IT IS HARD TO SIT STILL: NOT AT ALL
6. BECOMING EASILY ANNOYED OR IRRITABLE: NOT AT ALL

## 2025-04-16 ASSESSMENT — ENCOUNTER SYMPTOMS
DEPRESSION: 0
OCCASIONAL FEELINGS OF UNSTEADINESS: 0
LOSS OF SENSATION IN FEET: 0

## 2025-04-16 NOTE — PATIENT INSTRUCTIONS
Risk factors identified during visit:   BMI<18.5 or >25: Recommend weight loss efforts (see [email or web address]/nutrition for ideas)  BMI<18.5 or >25: Patient declines nutrition referrals    Flu shot: Patient declined.  PPSV23: Patient declined.  PCV13: Patient declined.  PCV20: Patient declined.  RSV: Patient declined.  Shingrix: Patient declined.  Colon cancer screening: Up to date (2023, repeat in 7-10 yrs--of note, has family history of colon cancer, will consider rechecking w/GI at 5 year point).  HIV screening: N/A.  Hepatitis C screening: Up to date.  Breast cancer screening: Ordered.   Cervical cancer screening: N/A (s/p hysterectomy for a benign etiology).  Osteoporosis screening: Ordered.    Recommend living will, health care power of .    Fasting labs ordered.  Liver ultrasound ordered.  Follow up with specialists as directed.    F/U 1 year: Annual wellness visit.    Lab services: [address]  Hours: M-F 7:15a-6:00p  Phone: [phone], Option 1

## 2025-04-16 NOTE — PROGRESS NOTES
Subjective   Reason for Visit: Karyna Noguera is an 64 y.o. female here for a Medicare Wellness visit.     Past Medical, Surgical, and Family History reviewed and updated in chart.    Reviewed all medications by prescribing practitioner or clinical pharmacist (such as prescriptions, OTCs, herbal therapies and supplements) and documented in the medical record.    HPI    Patient Self Assessment of Health Status  Patient Self Assessment: Good    Nutrition and Exercise  Current Diet: Well Balanced Diet  Adequate Fluid Intake: Yes  Caffeine: Yes  Exercise Frequency: Infrequently    Functional Ability/Level of Safety  Cognitive Impairment Observed: No cognitive impairment observed  Cognitive Impairment Reported: No cognitive impairment reported by patient or family    Home Safety Risk Factors: None    Medicare Wellness Billing Compliance Satisfied    *This is a visual tool to show completion of required items on the day of the visit. Green checks will only appear on the date of visit.    Review all medications by prescribing practitioner or clinical pharmacist (such as prescriptions, OTCs, herbal therapies and supplements) documented in the medical record    Past Medical, Surgical, and Family History reviewed and updated in chart    Tobacco Use Reviewed    Alcohol Use Reviewed    Illicit Drug Use Reviewed    PHQ2/9    Falls in Last Year Reviewed    Home Safety Risk Factors Reviewed    Cognitive Impairment Reviewed    Patient Self Assessment and Health Status    Current Diet Reviewed    Exercise Frequency    ADL - Hearing Impairment    ADL - Bathing    ADL - Dressing    ADL - Walks in Home    IADL - Managing Finances    IADL - Grocery Shopping    IADL - Taking Medications    IADL - Doing Housework      Patient Care Team:  Bijan Land MD as PCP - General (Family Medicine)  Bijan Land MD as PCP - [facility] ACO Attributed Provider     Review of Systems  No other complaints.     Objective   Vitals:  BP  "122/76 (BP Location: Left arm, Patient Position: Sitting, BP Cuff Size: Adult)   Pulse 75   Temp 36.7 °C (98 °F) (Temporal)   Ht 1.664 m (5' 5.5\")   Wt 81.6 kg (180 lb)   SpO2 94%   BMI 29.50 kg/m²       Physical Exam  Constitutional:       Appearance: She is obese.   Musculoskeletal:      Comments: Ambulating w/o assistance   Neurological:      Mental Status: She is oriented to person, place, and time.   Psychiatric:         Mood and Affect: Mood normal.         Behavior: Behavior normal.     Assessment/Plan   Diagnoses and all orders for this visit:  Initial Medicare annual wellness visit  Coronary artery spasm  -     Tx by cardiology  -     CBC; Future  -     Basic Metabolic Panel; Future  Hyperlipidemia, unspecified hyperlipidemia type  -     Tx by cardiology  -     Lipid Panel; Future  -     TSH with reflex to Free T4 if abnormal; Future  Raynaud's phenomenon without gangrene        -     Tx by cardiology  Fatty liver  -     Hepatic function panel; Future  -     US abdomen limited liver; Future  Prediabetes  -     Hemoglobin A1C; Future  Encounter for screening mammogram for malignant neoplasm of breast  -     BI mammo bilateral screening tomosynthesis; Future  Menopause  -     XR DEXA bone density; Future    Risk factors identified during visit:   BMI<18.5 or >25: Recommend weight loss efforts (see [email or web address]/nutrition for ideas)  BMI<18.5 or >25: Patient declines nutrition referrals    Flu shot: Patient declined.  PPSV23: Patient declined.  PCV13: Patient declined.  PCV20: Patient declined.  RSV: Patient declined.  Shingrix: Patient declined.  Colon cancer screening: Up to date (2023, repeat in 7-10 yrs--of note, has family history of colon cancer, will consider rechecking w/GI at 5 year point).  HIV screening: N/A.  Hepatitis C screening: Up to date.  Breast cancer screening: Ordered.   Cervical cancer screening: N/A (s/p hysterectomy for a benign etiology).  Osteoporosis " screening: Ordered.    Recommend living will, health care power of .    Fasting labs ordered.  Liver ultrasound ordered.  Follow up with specialists as directed.    F/U 1 year: Annual wellness visit.

## 2025-04-17 LAB
ALBUMIN SERPL-MCNC: 4.5 G/DL (ref 3.6–5.1)
ALBUMIN/GLOB SERPL: 1.7 (CALC) (ref 1–2.5)
ALP SERPL-CCNC: 108 U/L (ref 37–153)
ALT SERPL-CCNC: 15 U/L (ref 6–29)
ANION GAP SERPL CALCULATED.4IONS-SCNC: 13 MMOL/L (CALC) (ref 7–17)
AST SERPL-CCNC: 15 U/L (ref 10–35)
BILIRUB DIRECT SERPL-MCNC: 0.1 MG/DL
BILIRUB INDIRECT SERPL-MCNC: 0.4 MG/DL (CALC) (ref 0.2–1.2)
BILIRUB SERPL-MCNC: 0.5 MG/DL (ref 0.2–1.2)
BUN SERPL-MCNC: 16 MG/DL (ref 7–25)
BUN/CREAT SERPL: ABNORMAL (CALC) (ref 6–22)
CALCIUM SERPL-MCNC: 9.6 MG/DL (ref 8.6–10.4)
CHLORIDE SERPL-SCNC: 101 MMOL/L (ref 98–110)
CHOLEST SERPL-MCNC: 233 MG/DL
CHOLEST/HDLC SERPL: 4 (CALC)
CO2 SERPL-SCNC: 24 MMOL/L (ref 20–32)
CREAT SERPL-MCNC: 0.56 MG/DL (ref 0.5–1.05)
EGFRCR SERPLBLD CKD-EPI 2021: 101 ML/MIN/1.73M2
ERYTHROCYTE [DISTWIDTH] IN BLOOD BY AUTOMATED COUNT: 13 % (ref 11–15)
EST. AVERAGE GLUCOSE BLD GHB EST-MCNC: 128 MG/DL
EST. AVERAGE GLUCOSE BLD GHB EST-SCNC: 7.1 MMOL/L
GLOBULIN SER CALC-MCNC: 2.7 G/DL (CALC) (ref 1.9–3.7)
GLUCOSE SERPL-MCNC: 102 MG/DL (ref 65–99)
HBA1C MFR BLD: 6.1 %
HCT VFR BLD AUTO: 39.4 % (ref 35–45)
HDLC SERPL-MCNC: 58 MG/DL
HGB BLD-MCNC: 12.7 G/DL (ref 11.7–15.5)
LDLC SERPL CALC-MCNC: 149 MG/DL (CALC)
MCH RBC QN AUTO: 28.5 PG (ref 27–33)
MCHC RBC AUTO-ENTMCNC: 32.2 G/DL (ref 32–36)
MCV RBC AUTO: 88.5 FL (ref 80–100)
NONHDLC SERPL-MCNC: 175 MG/DL (CALC)
PLATELET # BLD AUTO: 358 THOUSAND/UL (ref 140–400)
PMV BLD REES-ECKER: 10.1 FL (ref 7.5–12.5)
POTASSIUM SERPL-SCNC: 4.4 MMOL/L (ref 3.5–5.3)
PROT SERPL-MCNC: 7.2 G/DL (ref 6.1–8.1)
RBC # BLD AUTO: 4.45 MILLION/UL (ref 3.8–5.1)
SODIUM SERPL-SCNC: 138 MMOL/L (ref 135–146)
TRIGL SERPL-MCNC: 131 MG/DL
TSH SERPL-ACNC: 1.57 MIU/L (ref 0.4–4.5)
WBC # BLD AUTO: 9 THOUSAND/UL (ref 3.8–10.8)

## 2025-04-22 ENCOUNTER — APPOINTMENT (OUTPATIENT)
Dept: ORTHOPEDIC SURGERY | Facility: CLINIC | Age: 65
End: 2025-04-22
Payer: MEDICARE

## 2025-04-25 ENCOUNTER — HOSPITAL ENCOUNTER (OUTPATIENT)
Dept: RADIOLOGY | Facility: CLINIC | Age: 65
Discharge: HOME | End: 2025-04-25
Payer: MEDICARE

## 2025-04-25 DIAGNOSIS — Z78.0 MENOPAUSE: ICD-10-CM

## 2025-04-25 DIAGNOSIS — Z12.31 ENCOUNTER FOR SCREENING MAMMOGRAM FOR MALIGNANT NEOPLASM OF BREAST: ICD-10-CM

## 2025-04-25 DIAGNOSIS — K76.0 FATTY LIVER: ICD-10-CM

## 2025-04-25 PROCEDURE — 76705 ECHO EXAM OF ABDOMEN: CPT

## 2025-04-25 PROCEDURE — 76705 ECHO EXAM OF ABDOMEN: CPT | Performed by: STUDENT IN AN ORGANIZED HEALTH CARE EDUCATION/TRAINING PROGRAM

## 2025-04-25 PROCEDURE — 77067 SCR MAMMO BI INCL CAD: CPT

## 2025-04-25 PROCEDURE — 77080 DXA BONE DENSITY AXIAL: CPT

## 2025-04-29 ENCOUNTER — APPOINTMENT (OUTPATIENT)
Dept: ORTHOPEDIC SURGERY | Facility: CLINIC | Age: 65
End: 2025-04-29
Payer: MEDICARE

## 2025-04-29 ENCOUNTER — HOSPITAL ENCOUNTER (OUTPATIENT)
Dept: RADIOLOGY | Facility: EXTERNAL LOCATION | Age: 65
Discharge: HOME | End: 2025-04-29

## 2025-04-29 ENCOUNTER — EVALUATION (OUTPATIENT)
Dept: PHYSICAL THERAPY | Facility: CLINIC | Age: 65
End: 2025-04-29
Payer: MEDICARE

## 2025-04-29 VITALS — WEIGHT: 180 LBS | HEIGHT: 65 IN | BODY MASS INDEX: 29.99 KG/M2

## 2025-04-29 DIAGNOSIS — M75.81 RIGHT ROTATOR CUFF TENDINITIS: ICD-10-CM

## 2025-04-29 DIAGNOSIS — M75.21 BICEPS TENDINITIS OF RIGHT SHOULDER: Primary | ICD-10-CM

## 2025-04-29 DIAGNOSIS — M25.511 ACUTE PAIN OF RIGHT SHOULDER: ICD-10-CM

## 2025-04-29 DIAGNOSIS — M75.01 ADHESIVE CAPSULITIS OF RIGHT SHOULDER: ICD-10-CM

## 2025-04-29 PROCEDURE — 1036F TOBACCO NON-USER: CPT | Performed by: EMERGENCY MEDICINE

## 2025-04-29 PROCEDURE — 99214 OFFICE O/P EST MOD 30 MIN: CPT | Performed by: EMERGENCY MEDICINE

## 2025-04-29 PROCEDURE — 1125F AMNT PAIN NOTED PAIN PRSNT: CPT | Performed by: EMERGENCY MEDICINE

## 2025-04-29 PROCEDURE — 3008F BODY MASS INDEX DOCD: CPT | Performed by: EMERGENCY MEDICINE

## 2025-04-29 PROCEDURE — 1159F MED LIST DOCD IN RCRD: CPT | Performed by: EMERGENCY MEDICINE

## 2025-04-29 PROCEDURE — 76942 ECHO GUIDE FOR BIOPSY: CPT | Performed by: EMERGENCY MEDICINE

## 2025-04-29 PROCEDURE — 97161 PT EVAL LOW COMPLEX 20 MIN: CPT | Mod: GP

## 2025-04-29 PROCEDURE — 20551 NJX 1 TENDON ORIGIN/INSJ: CPT | Performed by: EMERGENCY MEDICINE

## 2025-04-29 PROCEDURE — 1123F ACP DISCUSS/DSCN MKR DOCD: CPT | Performed by: EMERGENCY MEDICINE

## 2025-04-29 RX ORDER — MELOXICAM 15 MG/1
15 TABLET ORAL DAILY
Qty: 30 TABLET | Refills: 0 | Status: SHIPPED | OUTPATIENT
Start: 2025-04-29 | End: 2025-05-29

## 2025-04-29 RX ORDER — TRIAMCINOLONE ACETONIDE 40 MG/ML
40 INJECTION, SUSPENSION INTRA-ARTICULAR; INTRAMUSCULAR
Status: COMPLETED | OUTPATIENT
Start: 2025-04-29 | End: 2025-04-29

## 2025-04-29 RX ORDER — LIDOCAINE HYDROCHLORIDE 10 MG/ML
3 INJECTION, SOLUTION INFILTRATION; PERINEURAL
Status: COMPLETED | OUTPATIENT
Start: 2025-04-29 | End: 2025-04-29

## 2025-04-29 RX ADMIN — TRIAMCINOLONE ACETONIDE 40 MG: 40 INJECTION, SUSPENSION INTRA-ARTICULAR; INTRAMUSCULAR at 13:03

## 2025-04-29 RX ADMIN — LIDOCAINE HYDROCHLORIDE 3 ML: 10 INJECTION, SOLUTION INFILTRATION; PERINEURAL at 13:03

## 2025-04-29 ASSESSMENT — PAIN - FUNCTIONAL ASSESSMENT
PAIN_FUNCTIONAL_ASSESSMENT: 0-10
PAIN_FUNCTIONAL_ASSESSMENT: 0-10

## 2025-04-29 ASSESSMENT — PATIENT HEALTH QUESTIONNAIRE - PHQ9
SUM OF ALL RESPONSES TO PHQ9 QUESTIONS 1 AND 2: 0
1. LITTLE INTEREST OR PLEASURE IN DOING THINGS: NOT AT ALL
2. FEELING DOWN, DEPRESSED OR HOPELESS: NOT AT ALL

## 2025-04-29 ASSESSMENT — ENCOUNTER SYMPTOMS
LOSS OF SENSATION IN FEET: 0
OCCASIONAL FEELINGS OF UNSTEADINESS: 0
DEPRESSION: 0

## 2025-04-29 ASSESSMENT — PAIN SCALES - GENERAL
PAINLEVEL_OUTOF10: 3
PAINLEVEL_OUTOF10: 4

## 2025-04-29 NOTE — PROGRESS NOTES
Subjective    Patient ID: Karyna Noguera is a 64 y.o. female.    Chief Complaint: Pain of the Right Shoulder (New Problem: R shoulder problems for the past 5-6 weeks. Pain started when they were bowling and they felt a snap/crack. Pain is anterior and goes down to the elbow. Worse when laying, behind the back activites, or horizontal shoulder adduction. No Hx of previous trauma, Sx, or injections to the area. Reports numbness/tingling from the shoulder to the fingers. Reports neck pain. No diabetes. XR done 4/7/2025. Aleve with no help.  )     Last Surgery: No surgery found  Last Surgery Date: No surgery found    Karyna is a 64-year-old female coming in with acute on chronic right hip pain for the past 3 months without any history of traumatic events or known injuries.  She was seen by one of my surgical partners, Dr. Ríos, who referred her to physical therapy for some possible hip flexor tendinitis or a hip flexor strain.  She also has some known right hip osteoarthritis and after speaking with her physical therapist, he and Dr. Ríos believes that she may benefit from a right hip joint injection.  She is coming in with moderate pain that is worse with activity and slightly better with rest.  She has trouble with hip flexion.  The pain is located in the right anterior groin area.  She has not had an injection in the hip before but has had cortisone in her knee I believe.  She has taken naproxen and is doing physical therapy with mild improvement. We agreed to perform a right hip ultrasound-guided cortisone injection today in the office.  The patient tolerated the procedure well without any complications and activity modifications were reviewed.  She is going to continue physical therapy with her home exercises and will follow-up with me in about a month and if she is not better at that time we could potentially do a right hip flexor cortisone injection. Referred by Dr. Ríos.     Update on 1/14/2025.  Karyna alegria  coming back in for a follow-up visit.  She has responded very well to the right hip cortisone injection that we did on 12/17/2024.  She states that her groin pain is completely gone and that the pain that she was getting from her arthritis seems to have improved by at least 95%.  She is still taking Aleve twice daily.  Her complaint today is that she is having a decent amount of lateral hip pain near her greater trochanter.  Again she was limping pretty severely prior to the injection that we did in December.  No trauma.  She was able to bowl once earlier this week which makes her extremely happy.  No infectious symptoms.  No recent traumatic events or known injuries.  No other complaints or today.  We discussed her treatment options and eventually agreed to do a right gluteal tendon sheath cortisone injection here today in the office under ultrasound guidance.  The patient tolerated the procedure well without any complications and activity modifications were reviewed.  She is going to continue taking Aleve as needed and can also begin doing Tylenol 1000 mg 3 times a day at prescription dosing.  She is going to follow-up with me as needed moving forward and knows that we can repeat these injections as often as every 3 months.    Update on 4/29/2025.  Karyna returns today for a new problem.  She has had 6 weeks of right shoulder injury from bowling.  She recreationally bowls twice a week.  However, 6 weeks ago, during one of her bowling nights, she felt pain in the anterior shoulder but continued to bowl.  She noticed pain and was not following through.  She notes the next morning of some cracking of her right shoulder.  Denies any specific fall or trauma.  She notes worse with lying on right side, crossarm abduction, dressing herself, putting her bra on, reaching up and back.  She has been doing Aleve 440 mg twice a day.  She has tried Aspercreme and lidocaine without benefit.  She has done sling for the last 2 weeks  when she is out of house.  She has not done any physical therapy, injection, surgery.  She also follow-up on her right hip pain which has returned.  She notes relief from both right intra-articular hip and right gluteal tendon sheath corticosteroid injections. She is interested in repeating.         Objective   Right Shoulder Exam     Comments:  Right Shoulder Exam:  No swelling, warmth or ecchymosis of shoulder present.   AROM: Flexion 80 deg; Abduction 40 deg; ER to 40  PROM: Abduction 95 degree  TTP LHB tendon, subacromial space, trapezius  NTTP over clavicle, AC joint, posterior GHJ line, deltoid  4+/5 strength in ER, IR, abduction, flexion due to pain  SILT in all dermatomal distributions C5-T1    LABRUM: [-] O´landry´s  IMPINGEMENT:  [+] Hawkin´s, [+] Neer´s, [+] painful arc  ROTATOR CUFF: [-] Limb drop, [-] lag signs, [+] Empty Can (SS), [+] Belly press (SSc), [-] Hornblower (IS/TM)  BICEPS: [+] Speed´s, [+] Yergason´s  AC JOINT: [+] Scarf test          Image Results:  X-ray of the right shoulder obtained on 4/7/2025 was reviewed and revealed no acute fracture or dislocation.  Minimal glenohumeral osteoarthritis.  Significant A/C joint arthritis.    Patient ID: Karyna Noguera is a 64 y.o. female.    Tendon Sheath Injection: right long head of biceps tendon sheath on 4/29/2025 1:03 PM  Indications: pain and therapeutic benefit  Details: 22 G needle, ultrasound-guided anterior approach  Medications: 40 mg triamcinolone acetonide 40 mg/mL; 3 mL lidocaine 10 mg/mL (1 %)  Outcome: tolerated well, no immediate complications  Procedure, treatment alternatives, risks and benefits explained, specific risks discussed. Consent was given by the patient. Immediately prior to procedure a time out was called to verify the correct patient, procedure, equipment, support staff and site/side marked as required. Patient was prepped and draped in the usual sterile fashion.         Assessment/Plan   Encounter Diagnoses:  Biceps  tendinitis of right shoulder    Acute pain of right shoulder    Right rotator cuff tendinitis    Orders Placed This Encounter    Tendon Sheath Injection: right long head of biceps tendon sheath    Tendon Sheath Injection    Point of Care Ultrasound    Referral to Physical Therapy    meloxicam (Mobic) 15 mg tablet     Given no acute mechanism and she was able to bowl through pain, she most likely has right bicep tendinitis and right rotator cuff tendinitis. Her loss of range of motion is most likely due to 6 weeks of activity modification and not using her right shoulder.  We agreed to perform ultrasound-guided right bicep tendon sheath corticosteroid injection today.  It does not appear on ultrasound of a bicep tendon rupture nor did our physical exam indicate this.  We also agreed referral to physical therapy to regain right shoulder range of motion and strength and to take meloxicam 15 mg once daily for 1 month.  She will follow-up in 2 weeks for repeat right hip injection, likely intra-articular.    Simone Adamson MD  Primary Care Sports Medicine Fellow  [facility]  [facility]    ** Please excuse any errors in grammar or translation related to this dictation. Voice recognition software was utilized to prepare this document. **     I saw and evaluated the patient. I personally obtained the key and critical portions of the history and physical exam or was physically present for key and critical portions performed by the resident/fellow/EMILY. I reviewed the resident/fellow/EMILY's documentation and discussed the patient with the resident/fellow/EMILY. I agree with the resident/fellow/EMILY's medical decision making as documented in the note.    ** Please excuse any errors in grammar or translation related to this dictation. Voice recognition software was utilized to prepare this document. **       Pavel Leyva MD  [facility]

## 2025-04-29 NOTE — PROGRESS NOTES
Physical Therapy    Physical Therapy Evaluation and Treatment      Patient Name: Karyna Noguera  MRN: [id number]  Today's Date: 2025  : 1960    Time Entry:   Time Calculation  Start Time: 161  Stop Time: 1645  Time Calculation (min): 30 min  PT Evaluation Time Entry  PT Evaluation (Low) Time Entry: 30    Visit #1                        Assessment:  PT Assessment  PT Assessment Results: Decreased strength, Decreased range of motion, Decreased mobility, Pain  Rehab Prognosis: Good   The patient is a 64 year old female presenting to PT with right shoulder pain, right shoulder weakness and ROM deficits.  She will benefit from skilled intervention to address above deficits and return to previous activity level.    Plan:  OP PT Plan  Treatment/Interventions: Cryotherapy, Education/ Instruction, Electrical stimulation, Hot pack, Manual therapy, Therapeutic exercises, Ultrasound  PT Plan: Skilled PT  PT Frequency: 2 times per week  Number of Treatments Authorized: [facility] guidelines  Rehab Potential: Good  Plan of Care Agreement: Patient    Current Problem:   1. Biceps tendinitis of right shoulder  Referral to Physical Therapy    Follow Up In Physical Therapy      2. Right rotator cuff tendinitis  Referral to Physical Therapy          Subjective    Name &  confirmed by patient.  General:  General  Reason for Referral: biceps tendinitis R shoulder; RC tendinitis R shoulder  Referred By: Autumn LEIJA  Patient reports she has been experiencing R shoulder pain ever since bowling on 3/24/25.  Donning clothes and sleeping on her side exacerbates pain.  Pain ranges from 3-20+/10.  Ultrasound-guided right bicep tendon sheath corticosteroid injection today performed.  X-rays 25 revealed degenerative changes of the R shoulder.  The patient's goals are to restore full ROM of right shoulder and decrease pain.      Precautions:  Precautions  STEADI Fall Risk Score (The score of 4 or more indicates an increased risk of  falling): 0  Precautions Comment: none     Pain:  Pain Assessment  Pain Assessment: 0-10  0-10 (Numeric) Pain Score: 3  Pain Location: Shoulder  Pain Orientation: Right     Prior Level of Function:  Prior Function Per Pt/Caregiver Report  Level of [place]: Independent with ADLs and functional transfers  Hand Dominance: Right    Objective   R shoulder AROM  Flex - 55  Abd - 45  ER - 33    R shoulder strength  Flex - 2/5  Abd - 2/5  IR - 3+/5  ER - 3+/5    Outcome Measures:  Other Measures  Disability of Arm Shoulder Hand (DASH): 31     Treatments:  No treatment today d/t receiving US guided cortisone injection earlier today.     EDUCATION:   HEP next visit    Goals: (By week 6)  1) Decrease right shoulder pain with all activities to <2/10    2) Increase right shoulder strength to >4/5 to ease capability to lift light household objects    3) Increase right shoulder AROM to WNL to ease capability to perform overhead/reaching activities

## 2025-05-02 ENCOUNTER — TREATMENT (OUTPATIENT)
Dept: PHYSICAL THERAPY | Facility: CLINIC | Age: 65
End: 2025-05-02
Payer: MEDICARE

## 2025-05-02 DIAGNOSIS — M75.21 BICEPS TENDINITIS OF RIGHT SHOULDER: ICD-10-CM

## 2025-05-02 PROCEDURE — 97110 THERAPEUTIC EXERCISES: CPT | Mod: GP,CQ | Performed by: PHYSICAL THERAPY ASSISTANT

## 2025-05-02 PROCEDURE — 97140 MANUAL THERAPY 1/> REGIONS: CPT | Mod: GP,CQ | Performed by: PHYSICAL THERAPY ASSISTANT

## 2025-05-02 ASSESSMENT — PAIN - FUNCTIONAL ASSESSMENT: PAIN_FUNCTIONAL_ASSESSMENT: 0-10

## 2025-05-02 ASSESSMENT — PAIN SCALES - GENERAL: PAINLEVEL_OUTOF10: 3

## 2025-05-02 NOTE — PROGRESS NOTES
"Physical Therapy    Physical Therapy Treatment    Patient Name: Karyna Noguera  MRN: [id number]  : 1960  Today's Date: 2025  Time Calculation  Start Time: 930  Stop Time:   Time Calculation (min): 53 min    PT Therapeutic Procedures Time Entry  Manual Therapy Time Entry: 8  Therapeutic Exercise Time Entry: 35  PT Modalities Time Entry  Hot/Cold Pack Time Entry: 10       VISIT:# 2    Current Problem  Problem List Items Addressed This Visit    None  Visit Diagnoses         Codes      Biceps tendinitis of right shoulder     M75.21             Subjective    Patient reports she feels injection helped a little so far. Reports pain in anterior shoulder and not going down arm as much.      Pain  Pain Assessment: 0-10  0-10 (Numeric) Pain Score: 3  Pain Location: Shoulder  Pain Orientation: Right       Objective       R shoulder AROM  Flex - 98 degrees            Precautions  Precautions  Precautions Comment: none      Treatments:     EXERCISE Date 25 Date Date Date    Visit #21             Finger Ladder 5\"H x 10 S20             Pulleys      Flexion 3'      Pulleys      Scaption/Abduction 3'      Pulleys      IR              Tband       Pull down       Tband       Mid rows       Tband       IR       Tband       ER                     Wand Flex 5\"H 2 x 5      Wand ER  5\"H 2 x 5             Table slides  Flex/scap 5\"H x 5 each                                         Manual  8 min                                  CP 10 min                      HEP: wand flex/ER and Table slides  Flex/scap                   Assessment:      Patient challenged overall with initial session due to pain. Educated on importance of gentle stretching. Improved Active Right shoulder flex since initial measure. Patient performed and issued HEP.     Plan:    Assess response to initial session and progress ROM/strengthening program accordingly.     Goals:    Goals: (By week 6)  1) Decrease right shoulder pain with all activities to <2/10   "   2) Increase right shoulder strength to >4/5 to ease capability to lift light household objects     3) Increase right shoulder AROM to WNL to ease capability to perform overhead/reaching activities

## 2025-05-06 ENCOUNTER — TREATMENT (OUTPATIENT)
Dept: PHYSICAL THERAPY | Facility: CLINIC | Age: 65
End: 2025-05-06
Payer: MEDICARE

## 2025-05-06 DIAGNOSIS — M75.21 BICEPS TENDINITIS OF RIGHT SHOULDER: ICD-10-CM

## 2025-05-06 PROCEDURE — 97110 THERAPEUTIC EXERCISES: CPT | Mod: GP,CQ | Performed by: PHYSICAL THERAPY ASSISTANT

## 2025-05-06 PROCEDURE — 97140 MANUAL THERAPY 1/> REGIONS: CPT | Mod: GP,CQ | Performed by: PHYSICAL THERAPY ASSISTANT

## 2025-05-06 ASSESSMENT — PAIN SCALES - GENERAL: PAINLEVEL_OUTOF10: 2

## 2025-05-06 ASSESSMENT — PAIN - FUNCTIONAL ASSESSMENT: PAIN_FUNCTIONAL_ASSESSMENT: 0-10

## 2025-05-06 NOTE — PROGRESS NOTES
"Physical Therapy    Physical Therapy Treatment    Patient Name: Karyna Noguera  MRN: [id number]  : 1960  Today's Date: 2025  Time Calculation  Start Time: 930  Stop Time: 1023  Time Calculation (min): 53 min    PT Therapeutic Procedures Time Entry  Manual Therapy Time Entry: 8  Therapeutic Exercise Time Entry: 35  PT Modalities Time Entry  Hot/Cold Pack Time Entry: 10       VISIT:# 3    Current Problem  Problem List Items Addressed This Visit    None  Visit Diagnoses         Codes      Biceps tendinitis of right shoulder     M75.21             Subjective    Patient reports shoulder is feeling better, movements that increase pain are reaching back or across body. Reports compliance with HEP.   Pain  Pain Assessment: 0-10  0-10 (Numeric) Pain Score: 2  Pain Location: Shoulder  Pain Orientation: Right       Objective       R shoulder AROM  Flex - 98 degrees            Precautions  Precautions  Precautions Comment: none      Treatments:     EXERCISE Date 25 Date 25 Date Date    Visit #2 #3            Finger Ladder 5\"H x 10 S20 5\"H x 10 S21            Pulleys      Flexion 3' 3'     Pulleys      Scaption/Abduction 3' 3'     Pulleys      IR              Tband       Pull down       Tband       Mid rows       Tband       IR       Tband       ER                     Wand Flex 5\"H 2 x 5 5\"H 2 x 5     Wand ER  5\"H 2 x 5 5\"H 2 x 5            Table slides  Flex/scap 5\"H x 5 each 5\"H x 10 each     Table ER stretch  5\"H x 10 each                                 Manual  8 min 8 min                                 CP 10 min 10 min                     HEP: wand flex/ER and Table slides  Flex/scap                   Assessment:      Patient continues to be challenged overall with exercise due to pain. Educated on importance of gentle stretching.   Plan:   progress ROM/strengthening program accordingly.     Goals:    Goals: (By week 6)  1) Decrease right shoulder pain with all activities to <2/10     2) Increase right " shoulder strength to >4/5 to ease capability to lift light household objects     3) Increase right shoulder AROM to WNL to ease capability to perform overhead/reaching activities

## 2025-05-08 ENCOUNTER — TREATMENT (OUTPATIENT)
Dept: PHYSICAL THERAPY | Facility: CLINIC | Age: 65
End: 2025-05-08
Payer: MEDICARE

## 2025-05-08 DIAGNOSIS — M75.21 BICEPS TENDINITIS OF RIGHT SHOULDER: ICD-10-CM

## 2025-05-08 PROCEDURE — 97140 MANUAL THERAPY 1/> REGIONS: CPT | Mod: GP,CQ | Performed by: PHYSICAL THERAPY ASSISTANT

## 2025-05-08 PROCEDURE — 97110 THERAPEUTIC EXERCISES: CPT | Mod: GP,CQ | Performed by: PHYSICAL THERAPY ASSISTANT

## 2025-05-08 ASSESSMENT — PAIN - FUNCTIONAL ASSESSMENT: PAIN_FUNCTIONAL_ASSESSMENT: 0-10

## 2025-05-08 ASSESSMENT — PAIN SCALES - GENERAL: PAINLEVEL_OUTOF10: 3

## 2025-05-08 NOTE — PROGRESS NOTES
"Physical Therapy    Physical Therapy Treatment    Patient Name: Karyna Noguera  MRN: [id number]  : 1960  Today's Date: 2025  Time Calculation  Start Time: 0800  Stop Time: 0845  Time Calculation (min): 45 min    PT Therapeutic Procedures Time Entry  Manual Therapy Time Entry: 8  Therapeutic Exercise Time Entry: 37          VISIT:# 4    Current Problem  Problem List Items Addressed This Visit    None  Visit Diagnoses         Codes      Biceps tendinitis of right shoulder     M75.21             Subjective    Patient reports she hurt her shoulder getting dressed this morning. Reports compliance with HEP.   Pain  Pain Assessment: 0-10  0-10 (Numeric) Pain Score: 3  Pain Location: Shoulder  Pain Orientation: Right       Objective       R shoulder AROM  Flex - 120 degrees  Abd - 80  ER - 67          Precautions  Precautions  Precautions Comment: none      Treatments:     EXERCISE Date 25 Date 25 Date 25 Date    Visit #2 #3 #4           Finger Ladder 5\"H x 10 S20 5\"H x 10 S21 5\"H x 10 S21           Pulleys      Flexion 3' 3' 3'    Pulleys      Scaption/Abduction 3' 3' 3'    Pulleys      IR              Tband       Pull down   Green 2 x 10    Tband       Mid rows   Green 2 x 10    Tband       IR   Green 1x10, 1 x 5    Tband       ER   Yellow 2 x 10           Shoulder Arc   x 1 each table           Wand Flex 5\"H 2 x 5 5\"H 2 x 5 5\"H 1 x 10    Wand ER  5\"H 2 x 5 5\"H 2 x 5 5\"H 1 x 10           Table slides  Flex/scap 5\"H x 5 each 5\"H x 10 each HEP    Table ER stretch  5\"H x 10 each HEP                                Manual  8 min 8 min 8 min                                CP 10 min 10 min @ home                    HEP: wand flex/ER and Table slides  Flex/scap                   Assessment:      Patient able to progress exercise without increased symptoms. Patient Right shoulder AROM improved since last recorded.   Plan:   progress ROM/strengthening program accordingly.     Goals:    Goals: (By week 6)  1) " Decrease right shoulder pain with all activities to <2/10     2) Increase right shoulder strength to >4/5 to ease capability to lift light household objects     3) Increase right shoulder AROM to WNL to ease capability to perform overhead/reaching activities

## 2025-05-12 ENCOUNTER — TREATMENT (OUTPATIENT)
Dept: PHYSICAL THERAPY | Facility: CLINIC | Age: 65
End: 2025-05-12
Payer: MEDICARE

## 2025-05-12 DIAGNOSIS — M75.21 BICEPS TENDINITIS OF RIGHT SHOULDER: Primary | ICD-10-CM

## 2025-05-12 PROCEDURE — 97110 THERAPEUTIC EXERCISES: CPT | Mod: GP

## 2025-05-12 PROCEDURE — 97140 MANUAL THERAPY 1/> REGIONS: CPT | Mod: GP

## 2025-05-12 ASSESSMENT — PAIN - FUNCTIONAL ASSESSMENT: PAIN_FUNCTIONAL_ASSESSMENT: 0-10

## 2025-05-12 ASSESSMENT — PAIN SCALES - GENERAL: PAINLEVEL_OUTOF10: 4

## 2025-05-12 NOTE — PROGRESS NOTES
"Physical Therapy    Physical Therapy Treatment    Patient Name: Karyna Noguera  MRN: [id number]  : 1960  Today's Date: 2025  Time Calculation  Start Time: 1110  Stop Time: 1200  Time Calculation (min): 50 min    PT Therapeutic Procedures Time Entry  Manual Therapy Time Entry: 10  Therapeutic Exercise Time Entry: 30     Non-Billable Time  Non-billable time: 10  Non-billable time reason: 10' CP R shoulder    VISIT:# 5    Current Problem  Problem List Items Addressed This Visit    None  Visit Diagnoses         Codes      Biceps tendinitis of right shoulder    -  Primary M75.21            Subjective    Patient reports ROM of right shoulder is improving.  She states her right shoulder pain has been 4/10 the past few days.    Pain  Pain Assessment: 0-10  0-10 (Numeric) Pain Score: 4  Pain Location: Shoulder  Pain Orientation: Right       Objective       R shoulder PROM  Flex - 85 degrees        Precautions  Precautions  Precautions Comment: none      Treatments:     EXERCISE Date 25 Date 25 Date 25 Date 25    Visit #2 #3 #4 #5          Finger Ladder 5\"H x 10 S20 5\"H x 10 S21 5\"H x 10 S21 5\"H x 10 S22          Pulleys      Flexion 3' 3' 3' 3'   Pulleys      Scaption/Abduction 3' 3' 3' 3'   Pulleys      IR    X10 5\"H          Tband       Pull down   Green 2 x 10 Green 2 x 10   Tband       Mid rows   Green 2 x 10 Green 2 x 10   Tband       IR   Green 1x10, 1 x 5    Tband       ER   Yellow 2 x 10           Shoulder Arc   x 1 each table X 1 ea table           Wand Flex 5\"H 2 x 5 5\"H 2 x 5 5\"H 1 x 10    Wand ER  5\"H 2 x 5 5\"H 2 x 5 5\"H 1 x 10           Table slides  Flex/scap 5\"H x 5 each 5\"H x 10 each HEP    Table ER stretch  5\"H x 10 each HEP                  ISO IR/ER    X10 ea 5\"H          Manual  8 min 8 min 8 min 10'                               CP 10 min 10 min @ home 10'                   HEP: wand flex/ER and Table slides  Flex/scap                   Assessment:   Patient's right shoulder " flexion ROM has improved since beginning therapy.       Plan:   progress ROM/strengthening program accordingly.     Goals:    Goals: (By week 6)  1) Decrease right shoulder pain with all activities to <2/10     2) Increase right shoulder strength to >4/5 to ease capability to lift light household objects     3) Increase right shoulder AROM to WNL to ease capability to perform overhead/reaching activities -progressing

## 2025-05-13 ENCOUNTER — HOSPITAL ENCOUNTER (OUTPATIENT)
Dept: RADIOLOGY | Facility: EXTERNAL LOCATION | Age: 65
Discharge: HOME | End: 2025-05-13

## 2025-05-13 ENCOUNTER — APPOINTMENT (OUTPATIENT)
Dept: ORTHOPEDIC SURGERY | Facility: CLINIC | Age: 65
End: 2025-05-13
Payer: MEDICARE

## 2025-05-13 VITALS — WEIGHT: 180 LBS | HEIGHT: 65 IN | BODY MASS INDEX: 29.99 KG/M2

## 2025-05-13 DIAGNOSIS — M76.01 GLUTEAL TENDINITIS OF RIGHT BUTTOCK: ICD-10-CM

## 2025-05-13 DIAGNOSIS — M16.11 PRIMARY OSTEOARTHRITIS OF RIGHT HIP: Primary | ICD-10-CM

## 2025-05-13 PROCEDURE — 1125F AMNT PAIN NOTED PAIN PRSNT: CPT | Performed by: EMERGENCY MEDICINE

## 2025-05-13 PROCEDURE — 3008F BODY MASS INDEX DOCD: CPT | Performed by: EMERGENCY MEDICINE

## 2025-05-13 PROCEDURE — 1036F TOBACCO NON-USER: CPT | Performed by: EMERGENCY MEDICINE

## 2025-05-13 PROCEDURE — 99214 OFFICE O/P EST MOD 30 MIN: CPT | Performed by: EMERGENCY MEDICINE

## 2025-05-13 PROCEDURE — 20611 DRAIN/INJ JOINT/BURSA W/US: CPT | Performed by: EMERGENCY MEDICINE

## 2025-05-13 RX ORDER — METHYLPREDNISOLONE ACETATE 40 MG/ML
80 INJECTION, SUSPENSION INTRA-ARTICULAR; INTRALESIONAL; INTRAMUSCULAR; SOFT TISSUE
Status: COMPLETED | OUTPATIENT
Start: 2025-05-13 | End: 2025-05-13

## 2025-05-13 RX ORDER — LIDOCAINE HYDROCHLORIDE 10 MG/ML
4 INJECTION, SOLUTION INFILTRATION; PERINEURAL
Status: COMPLETED | OUTPATIENT
Start: 2025-05-13 | End: 2025-05-13

## 2025-05-13 RX ADMIN — METHYLPREDNISOLONE ACETATE 80 MG: 40 INJECTION, SUSPENSION INTRA-ARTICULAR; INTRALESIONAL; INTRAMUSCULAR; SOFT TISSUE at 11:39

## 2025-05-13 RX ADMIN — LIDOCAINE HYDROCHLORIDE 4 ML: 10 INJECTION, SOLUTION INFILTRATION; PERINEURAL at 11:39

## 2025-05-13 ASSESSMENT — PAIN SCALES - GENERAL: PAINLEVEL_OUTOF10: 2

## 2025-05-13 ASSESSMENT — PAIN - FUNCTIONAL ASSESSMENT: PAIN_FUNCTIONAL_ASSESSMENT: 0-10

## 2025-05-13 ASSESSMENT — PAIN DESCRIPTION - DESCRIPTORS: DESCRIPTORS: ACHING

## 2025-05-13 NOTE — PROGRESS NOTES
Pt returning today for right hip pain. Pt last injected 12/17/2024  Pain relief lasted around 3 months. Pain scale 3/10  Glute 01/04/2025  Bicep 04/29/2025    Subjective    Patient ID: Karyna Noguera is a 65 y.o. female.    Chief Complaint: Pain of the Right Hip     Last Surgery: No surgery found  Last Surgery Date: No surgery found    Karyna is a 64-year-old female coming in with acute on chronic right hip pain for the past 3 months without any history of traumatic events or known injuries.  She was seen by one of my surgical partners, Dr. Ríos, who referred her to physical therapy for some possible hip flexor tendinitis or a hip flexor strain.  She also has some known right hip osteoarthritis and after speaking with her physical therapist, he and Dr. Ríos believes that she may benefit from a right hip joint injection.  She is coming in with moderate pain that is worse with activity and slightly better with rest.  She has trouble with hip flexion.  The pain is located in the right anterior groin area.  She has not had an injection in the hip before but has had cortisone in her knee I believe.  She has taken naproxen and is doing physical therapy with mild improvement. We agreed to perform a right hip ultrasound-guided cortisone injection today in the office.  The patient tolerated the procedure well without any complications and activity modifications were reviewed.  She is going to continue physical therapy with her home exercises and will follow-up with me in about a month and if she is not better at that time we could potentially do a right hip flexor cortisone injection. Referred by Dr. Ríos.     Update on 1/14/2025.  Karyna is coming back in for a follow-up visit.  She has responded very well to the right hip cortisone injection that we did on 12/17/2024.  She states that her groin pain is completely gone and that the pain that she was getting from her arthritis seems to have improved by at least 95%.  She is  still taking Aleve twice daily.  Her complaint today is that she is having a decent amount of lateral hip pain near her greater trochanter.  Again she was limping pretty severely prior to the injection that we did in December.  No trauma.  She was able to bowl once earlier this week which makes her extremely happy.  No infectious symptoms.  No recent traumatic events or known injuries.  No other complaints or today.  We discussed her treatment options and eventually agreed to do a right gluteal tendon sheath cortisone injection here today in the office under ultrasound guidance.  The patient tolerated the procedure well without any complications and activity modifications were reviewed.  She is going to continue taking Aleve as needed and can also begin doing Tylenol 1000 mg 3 times a day at prescription dosing.  She is going to follow-up with me as needed moving forward and knows that we can repeat these injections as often as every 3 months.    Update on 4/29/2025.  Karyna returns today for a new problem.  She has had 6 weeks of right shoulder injury from bowling.  She recreationally bowls twice a week.  However, 6 weeks ago, during one of her bowling nights, she felt pain in the anterior shoulder but continued to bowl.  She noticed pain and was not following through.  She notes the next morning of some cracking of her right shoulder.  Denies any specific fall or trauma.  She notes worse with lying on right side, crossarm abduction, dressing herself, putting her bra on, reaching up and back.  She has been doing Aleve 440 mg twice a day.  She has tried Aspercreme and lidocaine without benefit.  She has done sling for the last 2 weeks when she is out of house.  She has not done any physical therapy, injection, surgery.  She also follow-up on her right hip pain which has returned.  She notes relief from both right intra-articular hip and right gluteal tendon sheath corticosteroid injections. She is interested in  repeating. Given no acute mechanism and she was able to bowl through pain, she most likely has right bicep tendinitis and right rotator cuff tendinitis. Her loss of range of motion is most likely due to 6 weeks of activity modification and not using her right shoulder.  We agreed to perform ultrasound-guided right bicep tendon sheath corticosteroid injection today.  It does not appear on ultrasound of a bicep tendon rupture nor did our physical exam indicate this.  We also agreed referral to physical therapy to regain right shoulder range of motion and strength and to take meloxicam 15 mg once daily for 1 month.  She will follow-up in 2 weeks for repeat right hip injection, likely intra-articular.    Update on 5/13/2025.  Patient is coming back in for acute on chronic right hip pain secondary to some gluteal tendinitis and osteoarthritis of the right hip joint.  We did a right hip ultrasound-guided cortisone injection back on 12/17/2024 that provided her with really good pain relief and she is interested in doing another injection here today.  She has 4 out of 10 pain and is having catching in her groin area.  The pain is not constant and sometimes does radiate laterally so she may end up needing another gluteal tendon sheath injection.  Of note she is doing PT for her shoulder and states that she has felt better since her bicep tendon sheath injection but would like to come back in for evaluation of her shoulder as well.  She is taking meloxicam.  No trauma.  No infectious symptoms.  No other complaints at this time.        Objective   Right Hip Exam     Tenderness   The patient is experiencing tenderness in the greater trochanter.    Range of Motion   Abduction:  abnormal   Adduction:  normal   Extension:  normal   Flexion:  abnormal   External rotation:  abnormal   Internal rotation:  abnormal     Muscle Strength   The patient has normal right hip strength.  Abduction: 5/5   Adduction: 5/5   Flexion: 5/5      Other   Erythema: absent  Sensation: normal      Left Hip Exam   Left hip exam is normal.          Image Results:  No new imaging today    Patient ID: Karyna Noguera is a 65 y.o. female.    L Inj/Asp: R hip joint on 5/13/2025 11:39 AM  Indications: pain  Details: 20 G needle, ultrasound-guided anterior approach  Medications: 80 mg methylPREDNISolone acetate 40 mg/mL; 4 mL lidocaine 10 mg/mL (1 %)  Outcome: tolerated well, no immediate complications  Procedure, treatment alternatives, risks and benefits explained, specific risks discussed. Consent was given by the patient. Immediately prior to procedure a time out was called to verify the correct patient, procedure, equipment, support staff and site/side marked as required. Patient was prepped and draped in the usual sterile fashion.         Assessment/Plan   Encounter Diagnoses:  Primary osteoarthritis of right hip    Gluteal tendinitis of right buttock    Orders Placed This Encounter    L Inj/Asp    Point of Care Ultrasound     We discussed her treatment options and agreed to repeat a right hip cortisone injection under ultrasound guidance.  The patient tolerated it well without any complications and activity modifications were reviewed.  She is going to follow-up with me as needed in regards to the right hip and knows that we could potentially repeat this injection as often as every 3 months or even try another gluteal tendon sheath injection in the future if needed.  For the right shoulder she is going to follow-up with me in about 2 weeks.  It sounds like she may need additional imaging or a subacromial versus glenohumeral or injection under ultrasound.    ** Please excuse any errors in grammar or translation related to this dictation. Voice recognition software was utilized to prepare this document. **       Pavel Leyva MD  [facility]

## 2025-05-15 ENCOUNTER — TREATMENT (OUTPATIENT)
Dept: PHYSICAL THERAPY | Facility: CLINIC | Age: 65
End: 2025-05-15
Payer: MEDICARE

## 2025-05-15 DIAGNOSIS — M75.21 BICEPS TENDINITIS OF RIGHT SHOULDER: ICD-10-CM

## 2025-05-15 PROCEDURE — 97110 THERAPEUTIC EXERCISES: CPT | Mod: GP,CQ | Performed by: PHYSICAL THERAPY ASSISTANT

## 2025-05-15 ASSESSMENT — PAIN - FUNCTIONAL ASSESSMENT: PAIN_FUNCTIONAL_ASSESSMENT: 0-10

## 2025-05-15 ASSESSMENT — PAIN SCALES - GENERAL: PAINLEVEL_OUTOF10: 2

## 2025-05-15 NOTE — PROGRESS NOTES
"Physical Therapy    Physical Therapy Treatment    Patient Name: Karyna Noguera  MRN: [id number]  : 1960  Today's Date: 5/15/2025  Time Calculation  Start Time: 930  Stop Time:   Time Calculation (min): 45 min    PT Therapeutic Procedures Time Entry  Manual Therapy Time Entry: 10  Therapeutic Exercise Time Entry: 25  PT Modalities Time Entry  Hot/Cold Pack Time Entry: 10       VISIT:# 6    Current Problem  Problem List Items Addressed This Visit    None  Visit Diagnoses         Codes      Biceps tendinitis of right shoulder     M75.21            Subjective      Patient reports rotation is still limiting, pain and ROM improving overall, difficulty with dressing, holding dishes.  Pain  Pain Assessment: 0-10  0-10 (Numeric) Pain Score: 2  Pain Location: Shoulder  Pain Orientation: Right       Objective       R shoulder PROM  Flex - 85 degrees        Precautions  Precautions  Precautions Comment: none      Treatments:     EXERCISE Date 25 Date 5/15/25    #5 #6        Finger Ladder 5\"H x 10 S22 5\"H x 10 S22        Pulleys      Flexion 3' 3'   Pulleys      Scaption/Abduction 3' 3'   Pulleys      IR X10 5\"H X10 5\"H        Tband       Pull down Green 2 x 10 Green 2 x 10   Tband       Mid rows Green 2 x 10 Green 2 x 10   Tband       IR     Tband       ER          Shoulder Arc X 1 ea table  X 1 ea table              ISO IR/ER X10 ea 5\"H X10 ea 5\"H        Manual  10' 10'                       CP 10' 10'               HEP: wand flex/ER and Table slides  Flex/scap   5/15/25: Added Isometric IR/ER                Assessment:   Pain continues to be limiting factor in progression of therapy.      Plan:   progress ROM/strengthening program accordingly.   RTD 25  Goals:    Goals: (By week 6)  1) Decrease right shoulder pain with all activities to <2/10     2) Increase right shoulder strength to >4/5 to ease capability to lift light household objects     3) Increase right shoulder AROM to WNL to ease capability to " perform overhead/reaching activities -progressing

## 2025-05-19 ENCOUNTER — TREATMENT (OUTPATIENT)
Dept: PHYSICAL THERAPY | Facility: CLINIC | Age: 65
End: 2025-05-19
Payer: MEDICARE

## 2025-05-19 DIAGNOSIS — M75.21 BICEPS TENDINITIS OF RIGHT SHOULDER: Primary | ICD-10-CM

## 2025-05-19 PROCEDURE — 97110 THERAPEUTIC EXERCISES: CPT | Mod: GP

## 2025-05-19 ASSESSMENT — PAIN SCALES - GENERAL: PAINLEVEL_OUTOF10: 3

## 2025-05-19 ASSESSMENT — PAIN - FUNCTIONAL ASSESSMENT: PAIN_FUNCTIONAL_ASSESSMENT: 0-10

## 2025-05-19 NOTE — PROGRESS NOTES
"Physical Therapy    Physical Therapy Treatment    Patient Name: Karyna Noguera  MRN: [id number]  : 1960  Today's Date: 2025  Time Calculation  Start Time: 935  Stop Time:   Time Calculation (min): 40 min    PT Therapeutic Procedures Time Entry  Therapeutic Exercise Time Entry: 30     Non-Billable Time  Non-billable time: 10  Non-billable time reason: 10' CP R shoulder    VISIT:# 7    Current Problem  Problem List Items Addressed This Visit    None  Visit Diagnoses         Codes      Biceps tendinitis of right shoulder    -  Primary M75.21          Subjective    Patient reports no change in right shoulder pain since beginning PT.      Pain  Pain Assessment: 0-10  0-10 (Numeric) Pain Score: 3  Pain Location: Shoulder  Pain Orientation: Right       Objective       R shoulder ROM wall climbs  Flex - 122 degrees        Precautions  Precautions  Precautions Comment: none      Treatments:     EXERCISE Date 25 Date 5/15/25 5/19/25    #5 #6 #7         Finger Ladder 5\"H x 10 S22 5\"H x 10 S22 5\"H x 10         Pulleys      Flexion 3' 3' 3'   Pulleys      Scaption/Abduction 3' 3' 3'   Pulleys      IR X10 5\"H X10 5\"H X 10 5\"H         Tband       Pull down Green 2 x 10 Green 2 x 10 Green 2 x 10   Tband       Mid rows Green 2 x 10 Green 2 x 10 Green 2 x 10   Tband       IR      Tband       ER            Shoulder Arc X 1 ea table  X 1 ea table  X 1 ea table                ISO IR/ER X10 ea 5\"H X10 ea 5\"H X 10 ea 5\"H         Manual  10' 10'                            CP 10' 10' 10'                 HEP: wand flex/ER and Table slides  Flex/scap   5/15/25: Added Isometric IR/ER                Assessment:   Patient's R shoulder flexion ROM has improved since beginning therapy.       Plan:   progress ROM/strengthening program accordingly.   RTD 25  Goals:    Goals: (By week 6)  1) Decrease right shoulder pain with all activities to <2/10     2) Increase right shoulder strength to >4/5 to ease capability to lift " light household objects     3) Increase right shoulder AROM to WNL to ease capability to perform overhead/reaching activities -progressing

## 2025-05-22 ENCOUNTER — TREATMENT (OUTPATIENT)
Dept: PHYSICAL THERAPY | Facility: CLINIC | Age: 65
End: 2025-05-22
Payer: MEDICARE

## 2025-05-22 DIAGNOSIS — M75.21 BICEPS TENDINITIS OF RIGHT SHOULDER: Primary | ICD-10-CM

## 2025-05-22 PROCEDURE — 97110 THERAPEUTIC EXERCISES: CPT | Mod: GP

## 2025-05-22 ASSESSMENT — PAIN - FUNCTIONAL ASSESSMENT: PAIN_FUNCTIONAL_ASSESSMENT: 0-10

## 2025-05-22 ASSESSMENT — PAIN SCALES - GENERAL: PAINLEVEL_OUTOF10: 2

## 2025-05-22 NOTE — PROGRESS NOTES
"Physical Therapy.    Physical Therapy Treatment    Patient Name: Karyna Noguera  MRN: [id number]  : 1960  Today's Date: 2025  Time Calculation  Start Time: 945  Stop Time:   Time Calculation (min): 40 min    PT Therapeutic Procedures Time Entry  Therapeutic Exercise Time Entry: 30     Non-Billable Time  Non-billable time: 10  Non-billable time reason: 10' CP R shoulder following treatment    VISIT:# 8    Current Problem  Problem List Items Addressed This Visit    None  Visit Diagnoses         Codes      Biceps tendinitis of right shoulder    -  Primary M75.21            Subjective    Patient reports she continues to experience right shoulder pain when reaching behind her back and across the body.      Pain  Pain Assessment: 0-10  0-10 (Numeric) Pain Score: 2  Pain Location: Shoulder  Pain Orientation: Right       Objective       R shoulder ROM wall climbs  Flex - 122 degrees        Precautions  Precautions  Precautions Comment: none      Treatments:     EXERCISE Date 25 Date 5/15/25 5/19/25 5/22/25    #5 #6 #7 #8          Finger Ladder 5\"H x 10 S22 5\"H x 10 S22 5\"H x 10 5\"H x 10          Pulleys      Flexion 3' 3' 3' 3'   Pulleys      Scaption/Abduction 3' 3' 3' 3'   Pulleys      IR X10 5\"H X10 5\"H X 10 5\"H X 10 5\"H          Tband       Pull down Green 2 x 10 Green 2 x 10 Green 2 x 10 Green 2 x 10   Tband       Mid rows Green 2 x 10 Green 2 x 10 Green 2 x 10 Green 2 x 10   Tband       IR       Tband       ER              Shoulder Arc X 1 ea table  X 1 ea table  X 1 ea table  X 1 ea table                 ISO IR/ER X10 ea 5\"H X10 ea 5\"H X 10 ea 5\"H X 10 ea 5\"H          Manual  10' 10'                                 CP 10' 10' 10' 10'                   HEP: wand flex/ER and Table slides  Flex/scap   5/15/25: Added Isometric IR/ER              Assessment:   Patient reports pain ranged from 2-5/10 throughout treatment.  Patient scheduled for a follow up with Dr. Leyva next week.      Plan: "   progress ROM/strengthening program accordingly.   RTD 5/27/25  Goals:    Goals: (By week 6)  1) Decrease right shoulder pain with all activities to <2/10      2) Increase right shoulder strength to >4/5 to ease capability to lift light household objects     3) Increase right shoulder AROM to WNL to ease capability to perform overhead/reaching activities -progressing

## 2025-05-26 DIAGNOSIS — M75.21 BICEPS TENDINITIS OF RIGHT SHOULDER: ICD-10-CM

## 2025-05-26 DIAGNOSIS — M75.81 RIGHT ROTATOR CUFF TENDINITIS: ICD-10-CM

## 2025-05-27 ENCOUNTER — HOSPITAL ENCOUNTER (OUTPATIENT)
Dept: RADIOLOGY | Facility: EXTERNAL LOCATION | Age: 65
Discharge: HOME | End: 2025-05-27

## 2025-05-27 ENCOUNTER — APPOINTMENT (OUTPATIENT)
Dept: ORTHOPEDIC SURGERY | Facility: CLINIC | Age: 65
End: 2025-05-27
Payer: MEDICARE

## 2025-05-27 ENCOUNTER — TREATMENT (OUTPATIENT)
Dept: PHYSICAL THERAPY | Facility: CLINIC | Age: 65
End: 2025-05-27
Payer: MEDICARE

## 2025-05-27 VITALS — HEIGHT: 65 IN | BODY MASS INDEX: 29.99 KG/M2 | WEIGHT: 180 LBS

## 2025-05-27 DIAGNOSIS — M75.81 RIGHT ROTATOR CUFF TENDINITIS: ICD-10-CM

## 2025-05-27 DIAGNOSIS — M75.21 BICEPS TENDINITIS OF RIGHT SHOULDER: Primary | ICD-10-CM

## 2025-05-27 DIAGNOSIS — M75.01 ADHESIVE CAPSULITIS OF RIGHT SHOULDER: Primary | ICD-10-CM

## 2025-05-27 PROCEDURE — 99214 OFFICE O/P EST MOD 30 MIN: CPT | Performed by: EMERGENCY MEDICINE

## 2025-05-27 PROCEDURE — 97110 THERAPEUTIC EXERCISES: CPT | Mod: GP

## 2025-05-27 PROCEDURE — 1159F MED LIST DOCD IN RCRD: CPT | Performed by: EMERGENCY MEDICINE

## 2025-05-27 PROCEDURE — 3008F BODY MASS INDEX DOCD: CPT | Performed by: EMERGENCY MEDICINE

## 2025-05-27 RX ORDER — CELECOXIB 200 MG/1
200 CAPSULE ORAL 2 TIMES DAILY
Qty: 60 CAPSULE | Refills: 0 | Status: SHIPPED | OUTPATIENT
Start: 2025-05-27 | End: 2025-06-26

## 2025-05-27 RX ORDER — MELOXICAM 15 MG/1
15 TABLET ORAL DAILY
Qty: 30 TABLET | Refills: 0 | OUTPATIENT
Start: 2025-05-27

## 2025-05-27 ASSESSMENT — PAIN - FUNCTIONAL ASSESSMENT: PAIN_FUNCTIONAL_ASSESSMENT: 0-10

## 2025-05-27 ASSESSMENT — PAIN SCALES - GENERAL: PAINLEVEL_OUTOF10: 2

## 2025-05-27 NOTE — PROGRESS NOTES
"Physical Therapy    Physical Therapy Treatment (Discharge)    Patient Name: Karyna Noguera  MRN: [id number]  : 1960  Today's Date: 2025  Time Calculation  Start Time: 0900  Stop Time: 1000  Time Calculation (min): 60 min    PT Therapeutic Procedures Time Entry  Therapeutic Exercise Time Entry: 40     Non-Billable Time  Non-billable time: 20  Non-billable time reason: 10' HP R shoulder/10' CP R shoulder    VISIT:# 9    Current Problem  Problem List Items Addressed This Visit    None  Visit Diagnoses         Codes      Biceps tendinitis of right shoulder    -  Primary M75.21          Subjective    Patient reports she experiences constant right shoulder pain.      Pain  Pain Assessment: 0-10  0-10 (Numeric) Pain Score: 2  Pain Location: Shoulder  Pain Orientation: Right       Objective   Other Measures  Disability of Arm Shoulder Hand (DASH): 34     R shoulder AROM  Flex - 112  Abd - 67  ER - 33     R shoulder strength  Flex - 4-/5  Abd - 3+/5  IR - 4+/5  ER - 4/5        Precautions  Precautions  Precautions Comment: none      Treatments:     EXERCISE Date 25 Date 5/15/25 5/19/25 5/22/25 5/27/25    #5 #6 #7 #8 #9        Discharge 20'   Finger Ladder 5\"H x 10 S22 5\"H x 10 S22 5\"H x 10 5\"H x 10            Pulleys      Flexion 3' 3' 3' 3' 3'   Pulleys      Scaption/Abduction 3' 3' 3' 3' 3'   Pulleys      IR X10 5\"H X10 5\"H X 10 5\"H X 10 5\"H            Tband       Pull down Green 2 x 10 Green 2 x 10 Green 2 x 10 Green 2 x 10 Green 2 x 10   Tband       Mid rows Green 2 x 10 Green 2 x 10 Green 2 x 10 Green 2 x 10 Green 2 x 10   Tband       IR        Tband       ER                Shoulder Arc X 1 ea table  X 1 ea table  X 1 ea table  X 1 ea table X 1 ea table                   ISO IR/ER X10 ea 5\"H X10 ea 5\"H X 10 ea 5\"H X 10 ea 5\"H            Manual  10' 10'                                      CP 10' 10' 10' 10' 10'   HP R shoulder      10'             HEP: wand flex/ER and Table slides  Flex/scap   5/15/25: " Added Isometric IR/ER              Assessment:   Patient demonstrates increases in right shoulder AROM & strength since beginning PT.  Follow up scheduled with Dr. Leyva today.      Plan:   Discharge from PT today.  Thank you for your referral.        Goals: (By week 6)  1) Decrease right shoulder pain with all activities to <2/10 - partially met     2) Increase right shoulder strength to >4/5 to ease capability to lift light household objects -partially met     3) Increase right shoulder AROM to WNL to ease capability to perform overhead/reaching activities -partially met

## 2025-05-27 NOTE — PROGRESS NOTES
Pt returning today for right hip pain. Pt last injected 12/17/2024  Pain relief lasted around 3 months. Pain scale 3/10  Glute 01/04/2025  Bicep 04/29/2025    Subjective    Patient ID: Karyna Noguera is a 65 y.o. female.    Chief Complaint: Pain and Follow-up of the Right Shoulder (Bicep injection 4/29/25 - she states the injection didn't help at all)     Last Surgery: No surgery found  Last Surgery Date: No surgery found    Karyna is a 64-year-old female coming in with acute on chronic right hip pain for the past 3 months without any history of traumatic events or known injuries.  She was seen by one of my surgical partners, Dr. Ríos, who referred her to physical therapy for some possible hip flexor tendinitis or a hip flexor strain.  She also has some known right hip osteoarthritis and after speaking with her physical therapist, he and Dr. Ríos believes that she may benefit from a right hip joint injection.  She is coming in with moderate pain that is worse with activity and slightly better with rest.  She has trouble with hip flexion.  The pain is located in the right anterior groin area.  She has not had an injection in the hip before but has had cortisone in her knee I believe.  She has taken naproxen and is doing physical therapy with mild improvement. We agreed to perform a right hip ultrasound-guided cortisone injection today in the office.  The patient tolerated the procedure well without any complications and activity modifications were reviewed.  She is going to continue physical therapy with her home exercises and will follow-up with me in about a month and if she is not better at that time we could potentially do a right hip flexor cortisone injection. Referred by Dr. Ríos.     Update on 1/14/2025.  Karyna is coming back in for a follow-up visit.  She has responded very well to the right hip cortisone injection that we did on 12/17/2024.  She states that her groin pain is completely gone and that the  pain that she was getting from her arthritis seems to have improved by at least 95%.  She is still taking Aleve twice daily.  Her complaint today is that she is having a decent amount of lateral hip pain near her greater trochanter.  Again she was limping pretty severely prior to the injection that we did in December.  No trauma.  She was able to bowl once earlier this week which makes her extremely happy.  No infectious symptoms.  No recent traumatic events or known injuries.  No other complaints or today.  We discussed her treatment options and eventually agreed to do a right gluteal tendon sheath cortisone injection here today in the office under ultrasound guidance.  The patient tolerated the procedure well without any complications and activity modifications were reviewed.  She is going to continue taking Aleve as needed and can also begin doing Tylenol 1000 mg 3 times a day at prescription dosing.  She is going to follow-up with me as needed moving forward and knows that we can repeat these injections as often as every 3 months.    Update on 4/29/2025.  Karyna returns today for a new problem.  She has had 6 weeks of right shoulder injury from bowling.  She recreationally bowls twice a week.  However, 6 weeks ago, during one of her bowling nights, she felt pain in the anterior shoulder but continued to bowl.  She noticed pain and was not following through.  She notes the next morning of some cracking of her right shoulder.  Denies any specific fall or trauma.  She notes worse with lying on right side, crossarm abduction, dressing herself, putting her bra on, reaching up and back.  She has been doing Aleve 440 mg twice a day.  She has tried Aspercreme and lidocaine without benefit.  She has done sling for the last 2 weeks when she is out of house.  She has not done any physical therapy, injection, surgery.  She also follow-up on her right hip pain which has returned.  She notes relief from both right  intra-articular hip and right gluteal tendon sheath corticosteroid injections. She is interested in repeating. Given no acute mechanism and she was able to bowl through pain, she most likely has right bicep tendinitis and right rotator cuff tendinitis. Her loss of range of motion is most likely due to 6 weeks of activity modification and not using her right shoulder.  We agreed to perform ultrasound-guided right bicep tendon sheath corticosteroid injection today.  It does not appear on ultrasound of a bicep tendon rupture nor did our physical exam indicate this.  We also agreed referral to physical therapy to regain right shoulder range of motion and strength and to take meloxicam 15 mg once daily for 1 month.  She will follow-up in 2 weeks for repeat right hip injection, likely intra-articular.    Update on 5/13/2025.  Patient is coming back in for acute on chronic right hip pain secondary to some gluteal tendinitis and osteoarthritis of the right hip joint.  We did a right hip ultrasound-guided cortisone injection back on 12/17/2024 that provided her with really good pain relief and she is interested in doing another injection here today.  She has 4 out of 10 pain and is having catching in her groin area.  The pain is not constant and sometimes does radiate laterally so she may end up needing another gluteal tendon sheath injection.  Of note she is doing PT for her shoulder and states that she has felt better since her bicep tendon sheath injection but would like to come back in for evaluation of her shoulder as well.  She is taking meloxicam.  No trauma.  No infectious symptoms.  No other complaints at this time. We discussed her treatment options and agreed to repeat a right hip cortisone injection under ultrasound guidance.  The patient tolerated it well without any complications and activity modifications were reviewed.  She is going to follow-up with me as needed in regards to the right hip and knows that we  could potentially repeat this injection as often as every 3 months or even try another gluteal tendon sheath injection in the future if needed.  For the right shoulder she is going to follow-up with me in about 2 weeks.  It sounds like she may need additional imaging or a subacromial versus glenohumeral or injection under ultrasound.    Update on 5/27/2025.  Patient is coming back in for acute on chronic right shoulder pain.  We did a bicep tendon sheath injection on 4/29/2025.  It provided her with some improvement in her range of motion but she still having pretty severe pain.  Her pain right now is 5 out of 10 because she just finished up some physical therapy.  She has been taking meloxicam consistently without much improvement.  She is in tears today because the pain is becoming so frustrating.  No trauma.    Right Shoulder         Objective   Right Shoulder Exam     Tenderness   The patient is experiencing no tenderness.    Range of Motion   Active abduction:  90 abnormal   Passive abduction:  90 abnormal   Extension:  abnormal   External rotation:  abnormal   Forward flexion:  abnormal   Internal rotation 0 degrees:  abnormal   Internal rotation 90 degrees:  abnormal     Muscle Strength   Abduction: 4/5   Internal rotation: 5/5   External rotation: 4/5   Supraspinatus: 4/5     Tests   Mayo test: positive  Impingement: positive    Other   Erythema: absent  Sensation: normal    Comments:  Limited range of motion with painful strength testing      Left Shoulder Exam     Tenderness   The patient is experiencing no tenderness.            Image Results:  No new imaging today    Patient ID: Karyna Noguera is a 65 y.o. female.    Procedures    Assessment/Plan   Encounter Diagnoses:  Adhesive capsulitis of right shoulder    Right rotator cuff tendinitis    Orders Placed This Encounter    Point of Care Ultrasound    MR shoulder right wo IV contrast    celecoxib (CeleBREX) 200 mg capsule     We had a long discussion  about her treatment options and she ultimately is going to continue her physical therapy and home exercises.  We are going to switch her from meloxicam to Celebrex twice daily.  We are also going to obtain an MRI of the right shoulder to look for surgical pathology.  I am concerned about adhesive capsulitis with rotator cuff tendinitis causing some impingement but we are going to look for other potential causes of her pain and limited range of motion such as a rotator cuff or labral tear.  I will follow-up with her afterwards or will consider sending her to orthopedic surgery.  Depending on the MRI results we could also consider doing a glenohumeral or subacromial cortisone injection under ultrasound guidance.    ** Please excuse any errors in grammar or translation related to this dictation. Voice recognition software was utilized to prepare this document. **       Pavel Leyva MD  [facility]

## 2025-05-30 ENCOUNTER — HOSPITAL ENCOUNTER (OUTPATIENT)
Dept: RADIOLOGY | Facility: CLINIC | Age: 65
Discharge: HOME | End: 2025-05-30
Payer: MEDICARE

## 2025-05-30 DIAGNOSIS — M75.01 ADHESIVE CAPSULITIS OF RIGHT SHOULDER: ICD-10-CM

## 2025-05-30 PROCEDURE — 73221 MRI JOINT UPR EXTREM W/O DYE: CPT | Mod: RT

## 2025-05-30 NOTE — TELEPHONE ENCOUNTER
----- Message from Pavel Leyva sent at 5/30/2025 12:30 PM EDT -----  Can follow up with me in the office   ----- Message -----  From: Interface, Radiology Results In  Sent: 5/30/2025  12:14 PM EDT  To: Pavel Leyva MD

## 2025-06-03 ENCOUNTER — APPOINTMENT (OUTPATIENT)
Dept: ORTHOPEDIC SURGERY | Facility: CLINIC | Age: 65
End: 2025-06-03
Payer: MEDICARE

## 2025-06-03 ENCOUNTER — HOSPITAL ENCOUNTER (OUTPATIENT)
Dept: RADIOLOGY | Facility: EXTERNAL LOCATION | Age: 65
Discharge: HOME | End: 2025-06-03

## 2025-06-03 ENCOUNTER — OFFICE VISIT (OUTPATIENT)
Dept: CARDIOLOGY | Facility: HOSPITAL | Age: 65
End: 2025-06-03
Payer: MEDICARE

## 2025-06-03 VITALS
HEART RATE: 61 BPM | HEIGHT: 65 IN | SYSTOLIC BLOOD PRESSURE: 135 MMHG | BODY MASS INDEX: 29.74 KG/M2 | WEIGHT: 178.5 LBS | DIASTOLIC BLOOD PRESSURE: 85 MMHG

## 2025-06-03 VITALS — HEIGHT: 66 IN | BODY MASS INDEX: 28.7 KG/M2 | WEIGHT: 178.6 LBS

## 2025-06-03 DIAGNOSIS — E78.5 HYPERLIPIDEMIA, UNSPECIFIED HYPERLIPIDEMIA TYPE: Primary | ICD-10-CM

## 2025-06-03 DIAGNOSIS — M75.21 BICEPS TENDINITIS OF RIGHT SHOULDER: ICD-10-CM

## 2025-06-03 DIAGNOSIS — I73.00 RAYNAUD'S PHENOMENON WITHOUT GANGRENE: ICD-10-CM

## 2025-06-03 DIAGNOSIS — M75.81 RIGHT ROTATOR CUFF TENDINITIS: Primary | ICD-10-CM

## 2025-06-03 LAB
ATRIAL RATE: 61 BPM
P AXIS: 56 DEGREES
P OFFSET: 189 MS
P ONSET: 133 MS
PR INTERVAL: 174 MS
Q ONSET: 220 MS
QRS COUNT: 10 BEATS
QRS DURATION: 80 MS
QT INTERVAL: 408 MS
QTC CALCULATION(BAZETT): 410 MS
QTC FREDERICIA: 410 MS
R AXIS: 23 DEGREES
T AXIS: 55 DEGREES
T OFFSET: 424 MS
VENTRICULAR RATE: 61 BPM

## 2025-06-03 PROCEDURE — 99214 OFFICE O/P EST MOD 30 MIN: CPT | Performed by: EMERGENCY MEDICINE

## 2025-06-03 PROCEDURE — 99212 OFFICE O/P EST SF 10 MIN: CPT | Performed by: INTERNAL MEDICINE

## 2025-06-03 PROCEDURE — 93005 ELECTROCARDIOGRAM TRACING: CPT | Performed by: INTERNAL MEDICINE

## 2025-06-03 PROCEDURE — 1125F AMNT PAIN NOTED PAIN PRSNT: CPT | Performed by: EMERGENCY MEDICINE

## 2025-06-03 PROCEDURE — 1159F MED LIST DOCD IN RCRD: CPT | Performed by: INTERNAL MEDICINE

## 2025-06-03 PROCEDURE — 1036F TOBACCO NON-USER: CPT | Performed by: EMERGENCY MEDICINE

## 2025-06-03 PROCEDURE — 20611 DRAIN/INJ JOINT/BURSA W/US: CPT | Performed by: EMERGENCY MEDICINE

## 2025-06-03 PROCEDURE — 1160F RVW MEDS BY RX/DR IN RCRD: CPT | Performed by: INTERNAL MEDICINE

## 2025-06-03 PROCEDURE — 3008F BODY MASS INDEX DOCD: CPT | Performed by: EMERGENCY MEDICINE

## 2025-06-03 PROCEDURE — 1036F TOBACCO NON-USER: CPT | Performed by: INTERNAL MEDICINE

## 2025-06-03 PROCEDURE — 99214 OFFICE O/P EST MOD 30 MIN: CPT | Performed by: INTERNAL MEDICINE

## 2025-06-03 PROCEDURE — 3008F BODY MASS INDEX DOCD: CPT | Performed by: INTERNAL MEDICINE

## 2025-06-03 PROCEDURE — 1159F MED LIST DOCD IN RCRD: CPT | Performed by: EMERGENCY MEDICINE

## 2025-06-03 RX ORDER — TRIAMCINOLONE ACETONIDE 40 MG/ML
80 INJECTION, SUSPENSION INTRA-ARTICULAR; INTRAMUSCULAR
Status: COMPLETED | OUTPATIENT
Start: 2025-06-03 | End: 2025-06-03

## 2025-06-03 RX ORDER — AMLODIPINE BESYLATE 5 MG/1
5 TABLET ORAL
Qty: 90 TABLET | Refills: 3 | Status: SHIPPED | OUTPATIENT
Start: 2025-06-03

## 2025-06-03 RX ORDER — LIDOCAINE HYDROCHLORIDE 10 MG/ML
2 INJECTION, SOLUTION INFILTRATION; PERINEURAL
Status: COMPLETED | OUTPATIENT
Start: 2025-06-03 | End: 2025-06-03

## 2025-06-03 RX ORDER — ATORVASTATIN CALCIUM 80 MG/1
80 TABLET, FILM COATED ORAL NIGHTLY
Qty: 90 TABLET | Refills: 3 | Status: SHIPPED | OUTPATIENT
Start: 2025-06-03 | End: 2026-06-03

## 2025-06-03 RX ADMIN — TRIAMCINOLONE ACETONIDE 80 MG: 40 INJECTION, SUSPENSION INTRA-ARTICULAR; INTRAMUSCULAR at 13:20

## 2025-06-03 RX ADMIN — LIDOCAINE HYDROCHLORIDE 2 ML: 10 INJECTION, SOLUTION INFILTRATION; PERINEURAL at 13:20

## 2025-06-03 ASSESSMENT — PAIN - FUNCTIONAL ASSESSMENT: PAIN_FUNCTIONAL_ASSESSMENT: 0-10

## 2025-06-03 ASSESSMENT — PAIN SCALES - GENERAL: PAINLEVEL_OUTOF10: 3

## 2025-06-03 NOTE — PROGRESS NOTES
Subjective    Patient ID: Karyna Noguera is a 65 y.o. female.    Chief Complaint: Follow-up and Pain of the Right Shoulder (Their symptoms have remained unchanged since their last visit. MRI done 5/30/2025)     Last Surgery: No surgery found  Last Surgery Date: No surgery found    Karyna is a 64-year-old female coming in with acute on chronic right hip pain for the past 3 months without any history of traumatic events or known injuries.  She was seen by one of my surgical partners, Dr. Ríos, who referred her to physical therapy for some possible hip flexor tendinitis or a hip flexor strain.  She also has some known right hip osteoarthritis and after speaking with her physical therapist, he and Dr. íRos believes that she may benefit from a right hip joint injection.  She is coming in with moderate pain that is worse with activity and slightly better with rest.  She has trouble with hip flexion.  The pain is located in the right anterior groin area.  She has not had an injection in the hip before but has had cortisone in her knee I believe.  She has taken naproxen and is doing physical therapy with mild improvement. We agreed to perform a right hip ultrasound-guided cortisone injection today in the office.  The patient tolerated the procedure well without any complications and activity modifications were reviewed.  She is going to continue physical therapy with her home exercises and will follow-up with me in about a month and if she is not better at that time we could potentially do a right hip flexor cortisone injection. Referred by Dr. Ríos.     Update on 1/14/2025.  Karyna is coming back in for a follow-up visit.  She has responded very well to the right hip cortisone injection that we did on 12/17/2024.  She states that her groin pain is completely gone and that the pain that she was getting from her arthritis seems to have improved by at least 95%.  She is still taking Aleve twice daily.  Her complaint today  is that she is having a decent amount of lateral hip pain near her greater trochanter.  Again she was limping pretty severely prior to the injection that we did in December.  No trauma.  She was able to bowl once earlier this week which makes her extremely happy.  No infectious symptoms.  No recent traumatic events or known injuries.  No other complaints or today.  We discussed her treatment options and eventually agreed to do a right gluteal tendon sheath cortisone injection here today in the office under ultrasound guidance.  The patient tolerated the procedure well without any complications and activity modifications were reviewed.  She is going to continue taking Aleve as needed and can also begin doing Tylenol 1000 mg 3 times a day at prescription dosing.  She is going to follow-up with me as needed moving forward and knows that we can repeat these injections as often as every 3 months.    Update on 4/29/2025.  Karyna returns today for a new problem.  She has had 6 weeks of right shoulder injury from bowling.  She recreationally bowls twice a week.  However, 6 weeks ago, during one of her bowling nights, she felt pain in the anterior shoulder but continued to bowl.  She noticed pain and was not following through.  She notes the next morning of some cracking of her right shoulder.  Denies any specific fall or trauma.  She notes worse with lying on right side, crossarm abduction, dressing herself, putting her bra on, reaching up and back.  She has been doing Aleve 440 mg twice a day.  She has tried Aspercreme and lidocaine without benefit.  She has done sling for the last 2 weeks when she is out of house.  She has not done any physical therapy, injection, surgery.  She also follow-up on her right hip pain which has returned.  She notes relief from both right intra-articular hip and right gluteal tendon sheath corticosteroid injections. She is interested in repeating. Given no acute mechanism and she was able to  bowl through pain, she most likely has right bicep tendinitis and right rotator cuff tendinitis. Her loss of range of motion is most likely due to 6 weeks of activity modification and not using her right shoulder.  We agreed to perform ultrasound-guided right bicep tendon sheath corticosteroid injection today.  It does not appear on ultrasound of a bicep tendon rupture nor did our physical exam indicate this.  We also agreed referral to physical therapy to regain right shoulder range of motion and strength and to take meloxicam 15 mg once daily for 1 month.  She will follow-up in 2 weeks for repeat right hip injection, likely intra-articular.    Update on 5/13/2025.  Patient is coming back in for acute on chronic right hip pain secondary to some gluteal tendinitis and osteoarthritis of the right hip joint.  We did a right hip ultrasound-guided cortisone injection back on 12/17/2024 that provided her with really good pain relief and she is interested in doing another injection here today.  She has 4 out of 10 pain and is having catching in her groin area.  The pain is not constant and sometimes does radiate laterally so she may end up needing another gluteal tendon sheath injection.  Of note she is doing PT for her shoulder and states that she has felt better since her bicep tendon sheath injection but would like to come back in for evaluation of her shoulder as well.  She is taking meloxicam.  No trauma.  No infectious symptoms.  No other complaints at this time. We discussed her treatment options and agreed to repeat a right hip cortisone injection under ultrasound guidance.  The patient tolerated it well without any complications and activity modifications were reviewed.  She is going to follow-up with me as needed in regards to the right hip and knows that we could potentially repeat this injection as often as every 3 months or even try another gluteal tendon sheath injection in the future if needed.  For the  right shoulder she is going to follow-up with me in about 2 weeks.  It sounds like she may need additional imaging or a subacromial versus glenohumeral or injection under ultrasound.    Update on 5/27/2025.  Patient is coming back in for acute on chronic right shoulder pain.  We did a bicep tendon sheath injection on 4/29/2025.  It provided her with some improvement in her range of motion but she still having pretty severe pain.  Her pain right now is 5 out of 10 because she just finished up some physical therapy.  She has been taking meloxicam consistently without much improvement.  She is in tears today because the pain is becoming so frustrating.  No trauma. We had a long discussion about her treatment options and she ultimately is going to continue her physical therapy and home exercises.  We are going to switch her from meloxicam to Celebrex twice daily.  We are also going to obtain an MRI of the right shoulder to look for surgical pathology.  I am concerned about adhesive capsulitis with rotator cuff tendinitis causing some impingement but we are going to look for other potential causes of her pain and limited range of motion such as a rotator cuff or labral tear.  I will follow-up with her afterwards or will consider sending her to orthopedic surgery.  Depending on the MRI results we could also consider doing a glenohumeral or subacromial cortisone injection under ultrasound guidance.    Update on 6/3/2025.  Patient is coming back in with her acute on chronic right shoulder pain.  She is here for a follow-up visit and to review her MRI results.  She has rotator cuff and bicep tendon inflammation with some degenerative labral tearing and arthritis at her AC and glenohumeral joints.  No change in her symptoms.  She still has limited range of motion.  She is interested in continuing conservative treatment options and doing a different injection.    Right Shoulder         Objective   Right Shoulder Exam      Tenderness   The patient is experiencing no tenderness.    Range of Motion   Active abduction:  90 abnormal   Passive abduction:  90 abnormal   Extension:  abnormal   External rotation:  abnormal   Forward flexion:  abnormal   Internal rotation 0 degrees:  abnormal   Internal rotation 90 degrees:  abnormal     Muscle Strength   Abduction: 4/5   Internal rotation: 5/5   External rotation: 4/5   Supraspinatus: 4/5     Tests   Mayo test: positive  Impingement: positive    Other   Erythema: absent  Sensation: normal    Comments:  Limited range of motion with painful strength testing      Left Shoulder Exam     Tenderness   The patient is experiencing no tenderness.            Image Results:  MRI of the right shoulder was reviewed and interpreted by me on 6/3/2025.  No evidence of acute injury or fracture.  I do agree with the radiologist's findings and interpretations.    Patient ID: Karyna Noguera is a 65 y.o. female.    L Inj/Asp: R subacromial bursa on 6/3/2025 1:20 PM  Indications: pain  Details: 22 G needle, ultrasound-guided lateral approach  Medications: 80 mg triamcinolone acetonide 40 mg/mL; 2 mL lidocaine 10 mg/mL (1 %)  Outcome: tolerated well, no immediate complications  Procedure, treatment alternatives, risks and benefits explained, specific risks discussed. Consent was given by the patient. Immediately prior to procedure a time out was called to verify the correct patient, procedure, equipment, support staff and site/side marked as required. Patient was prepped and draped in the usual sterile fashion.           Assessment/Plan   Encounter Diagnoses:  Right rotator cuff tendinitis    Biceps tendinitis of right shoulder    Orders Placed This Encounter    L Inj/Asp    Point of Care Ultrasound     We discussed her treatment options and agreed for her to continue PT and her home exercise plan.  We also agreed to perform a right subacromial cortisone injection under ultrasound guidance here today in the  office.  The patient tolerated the procedure well without any complications and activity modifications were reviewed.  She is going to follow-up with me in about 2 months to see how she is responding and whether or not we should also do a glenohumeral injection prior to considering a surgical referral.  At this time, she still would like to avoid surgery and there was nothing obviously surgical on her MRI so I think that this is appropriate.    ** Please excuse any errors in grammar or translation related to this dictation. Voice recognition software was utilized to prepare this document. **       Pavel Leyva MD  [facility] Emergency & Sports Medicine

## 2025-06-03 NOTE — PROGRESS NOTES
Subjective:  Patient returns for an annual follow-up.  She is a pleasant 65-year-old female with hyperlipidemia.  She also has Raynaud's.  She may also have some coronary spasm.  She did have a normal coronary arteriogram many years ago, but she was noted to have some catheter spasm.    She has had an uneventful past year from a cardiovascular standpoint.  She has not had any hospitalizations.  She does not have any problematic chest discomfort.  She does have some rare palpitations but no sustained palpitations or syncope.  She does have her Raynaud's well-managed with appropriate amlodipine dosing.    She has been struggling with some orthopedic issues.  She did traumatize her right hip but this has fortunately recovered.  She is struggling with a traumatic right shoulder injury and is following up with orthopedics in the near future.    She admits that she had not been compliant with her statin therapy prior to her last lipid panel.    Objective:  General: Alert, usual pleasant self.  HEENT: Unchanged.  Lungs: Clear without crackles.  Cardiac: Normal S1 and S2.  Abdomen: Nontender.  Extremities: No edema.  Skin: No rash.  Neuro: Grossly intact and unchanged.    EKG: Normal sinus rhythm.  Within normal limits.    Lipid panel: Cholesterol-233, HDL-58, LDL-149, TG-131.    Impression/plan:  Karyna is generally clinically stable at this time.  Her blood pressure is in reasonably good range without need for antihypertensive therapy at this time.    She is not having any problematic chest pain symptomatology, so I did not think we needed to embark on a repeat ischemic workup or empirically escalate therapy for potential coronary spasm.    I did review with her the importance of maintaining better compliance with her atorvastatin.  She has been doing this more recently.  I will have her recheck her lipid panel in several months, and she will call me to review the results to be sure that we have these at goal.    I will  see her back in 1 year but she knows to call for any intercurrent concerns.    Patient instructions:    Continue current medications unchanged.    Obtain repeat fasting lipid panel in several months and call to review results.    Return to clinic in 1 year.

## 2025-06-30 ENCOUNTER — LAB REQUISITION (OUTPATIENT)
Dept: LAB | Facility: HOSPITAL | Age: 65
End: 2025-06-30
Payer: MEDICARE

## 2025-06-30 DIAGNOSIS — R10.9 ABDOMINAL PAIN, UNSPECIFIED ABDOMINAL LOCATION: ICD-10-CM

## 2025-06-30 DIAGNOSIS — K57.30 DIVERTICULOSIS OF LARGE INTESTINE WITHOUT DIVERTICULITIS: Primary | ICD-10-CM

## 2025-06-30 DIAGNOSIS — K57.30 DIVERTICULOSIS OF LARGE INTESTINE WITHOUT PERFORATION OR ABSCESS WITHOUT BLEEDING: ICD-10-CM

## 2025-06-30 LAB
CREAT SERPL-MCNC: 0.52 MG/DL (ref 0.5–1.05)
EGFRCR SERPLBLD CKD-EPI 2021: >90 ML/MIN/1.73M*2

## 2025-06-30 PROCEDURE — 82565 ASSAY OF CREATININE: CPT

## 2025-07-01 ENCOUNTER — HOSPITAL ENCOUNTER (INPATIENT)
Facility: HOSPITAL | Age: 65
End: 2025-07-01
Attending: EMERGENCY MEDICINE | Admitting: INTERNAL MEDICINE
Payer: MEDICARE

## 2025-07-01 ENCOUNTER — HOSPITAL ENCOUNTER (OUTPATIENT)
Dept: RADIOLOGY | Facility: CLINIC | Age: 65
Discharge: HOME | End: 2025-07-01
Payer: MEDICARE

## 2025-07-01 DIAGNOSIS — M75.01 ADHESIVE CAPSULITIS OF RIGHT SHOULDER: ICD-10-CM

## 2025-07-01 DIAGNOSIS — K57.20 COLONIC DIVERTICULAR ABSCESS: Primary | ICD-10-CM

## 2025-07-01 DIAGNOSIS — R10.9 ABDOMINAL PAIN, UNSPECIFIED ABDOMINAL LOCATION: ICD-10-CM

## 2025-07-01 DIAGNOSIS — K57.30 DIVERTICULOSIS OF LARGE INTESTINE WITHOUT DIVERTICULITIS: ICD-10-CM

## 2025-07-01 LAB
ALBUMIN SERPL BCP-MCNC: 3.8 G/DL (ref 3.4–5)
ALP SERPL-CCNC: 113 U/L (ref 33–136)
ALT SERPL W P-5'-P-CCNC: 24 U/L (ref 7–45)
ANION GAP SERPL CALC-SCNC: 18 MMOL/L (ref 10–20)
APPEARANCE UR: CLEAR
AST SERPL W P-5'-P-CCNC: 24 U/L (ref 9–39)
BASOPHILS # BLD AUTO: 0.04 X10*3/UL (ref 0–0.1)
BASOPHILS NFR BLD AUTO: 0.4 %
BILIRUB DIRECT SERPL-MCNC: 0.1 MG/DL (ref 0–0.3)
BILIRUB SERPL-MCNC: 0.5 MG/DL (ref 0–1.2)
BILIRUB UR STRIP.AUTO-MCNC: NEGATIVE MG/DL
BUN SERPL-MCNC: 11 MG/DL (ref 6–23)
CALCIUM SERPL-MCNC: 8.7 MG/DL (ref 8.6–10.3)
CHLORIDE SERPL-SCNC: 99 MMOL/L (ref 98–107)
CO2 SERPL-SCNC: 22 MMOL/L (ref 21–32)
COLOR UR: YELLOW
CREAT SERPL-MCNC: 0.53 MG/DL (ref 0.5–1.05)
EGFRCR SERPLBLD CKD-EPI 2021: >90 ML/MIN/1.73M*2
EOSINOPHIL # BLD AUTO: 0.05 X10*3/UL (ref 0–0.7)
EOSINOPHIL NFR BLD AUTO: 0.4 %
ERYTHROCYTE [DISTWIDTH] IN BLOOD BY AUTOMATED COUNT: 14.9 % (ref 11.5–14.5)
GLUCOSE SERPL-MCNC: 105 MG/DL (ref 74–99)
GLUCOSE UR STRIP.AUTO-MCNC: NORMAL MG/DL
HCT VFR BLD AUTO: 42.5 % (ref 36–46)
HGB BLD-MCNC: 13.4 G/DL (ref 12–16)
IMM GRANULOCYTES # BLD AUTO: 0.08 X10*3/UL (ref 0–0.7)
IMM GRANULOCYTES NFR BLD AUTO: 0.7 % (ref 0–0.9)
KETONES UR STRIP.AUTO-MCNC: ABNORMAL MG/DL
LACTATE SERPL-SCNC: 0.9 MMOL/L (ref 0.4–2)
LEUKOCYTE ESTERASE UR QL STRIP.AUTO: NEGATIVE
LIPASE SERPL-CCNC: 14 U/L (ref 9–82)
LYMPHOCYTES # BLD AUTO: 1.47 X10*3/UL (ref 1.2–4.8)
LYMPHOCYTES NFR BLD AUTO: 13.2 %
MAGNESIUM SERPL-MCNC: 1.96 MG/DL (ref 1.6–2.4)
MCH RBC QN AUTO: 27.6 PG (ref 26–34)
MCHC RBC AUTO-ENTMCNC: 31.5 G/DL (ref 32–36)
MCV RBC AUTO: 88 FL (ref 80–100)
MONOCYTES # BLD AUTO: 0.84 X10*3/UL (ref 0.1–1)
MONOCYTES NFR BLD AUTO: 7.5 %
MUCOUS THREADS #/AREA URNS AUTO: NORMAL /LPF
NEUTROPHILS # BLD AUTO: 8.69 X10*3/UL (ref 1.2–7.7)
NEUTROPHILS NFR BLD AUTO: 77.8 %
NITRITE UR QL STRIP.AUTO: NEGATIVE
NRBC BLD-RTO: 0 /100 WBCS (ref 0–0)
PH UR STRIP.AUTO: 6 [PH]
PLATELET # BLD AUTO: 308 X10*3/UL (ref 150–450)
POTASSIUM SERPL-SCNC: 3.9 MMOL/L (ref 3.5–5.3)
PROT SERPL-MCNC: 6.5 G/DL (ref 6.4–8.2)
PROT UR STRIP.AUTO-MCNC: ABNORMAL MG/DL
RBC # BLD AUTO: 4.85 X10*6/UL (ref 4–5.2)
RBC # UR STRIP.AUTO: ABNORMAL MG/DL
RBC #/AREA URNS AUTO: NORMAL /HPF
SODIUM SERPL-SCNC: 135 MMOL/L (ref 136–145)
SP GR UR STRIP.AUTO: >1.05
SQUAMOUS #/AREA URNS AUTO: NORMAL /HPF
UROBILINOGEN UR STRIP.AUTO-MCNC: NORMAL MG/DL
WBC # BLD AUTO: 11.2 X10*3/UL (ref 4.4–11.3)
WBC #/AREA URNS AUTO: NORMAL /HPF

## 2025-07-01 PROCEDURE — 99285 EMERGENCY DEPT VISIT HI MDM: CPT | Performed by: EMERGENCY MEDICINE

## 2025-07-01 PROCEDURE — 83605 ASSAY OF LACTIC ACID: CPT

## 2025-07-01 PROCEDURE — 82248 BILIRUBIN DIRECT: CPT

## 2025-07-01 PROCEDURE — 2500000001 HC RX 250 WO HCPCS SELF ADMINISTERED DRUGS (ALT 637 FOR MEDICARE OP)

## 2025-07-01 PROCEDURE — 74177 CT ABD & PELVIS W/CONTRAST: CPT | Performed by: RADIOLOGY

## 2025-07-01 PROCEDURE — 2500000001 HC RX 250 WO HCPCS SELF ADMINISTERED DRUGS (ALT 637 FOR MEDICARE OP): Performed by: INTERNAL MEDICINE

## 2025-07-01 PROCEDURE — 2500000004 HC RX 250 GENERAL PHARMACY W/ HCPCS (ALT 636 FOR OP/ED): Performed by: EMERGENCY MEDICINE

## 2025-07-01 PROCEDURE — 83690 ASSAY OF LIPASE: CPT

## 2025-07-01 PROCEDURE — 2500000004 HC RX 250 GENERAL PHARMACY W/ HCPCS (ALT 636 FOR OP/ED): Performed by: INTERNAL MEDICINE

## 2025-07-01 PROCEDURE — 1100000001 HC PRIVATE ROOM DAILY

## 2025-07-01 PROCEDURE — 85025 COMPLETE CBC W/AUTO DIFF WBC: CPT

## 2025-07-01 PROCEDURE — 80048 BASIC METABOLIC PNL TOTAL CA: CPT

## 2025-07-01 PROCEDURE — 83735 ASSAY OF MAGNESIUM: CPT

## 2025-07-01 PROCEDURE — 2550000001 HC RX 255 CONTRASTS: Mod: JW | Performed by: INTERNAL MEDICINE

## 2025-07-01 PROCEDURE — 74177 CT ABD & PELVIS W/CONTRAST: CPT

## 2025-07-01 PROCEDURE — 81001 URINALYSIS AUTO W/SCOPE: CPT

## 2025-07-01 PROCEDURE — 36415 COLL VENOUS BLD VENIPUNCTURE: CPT

## 2025-07-01 PROCEDURE — 96375 TX/PRO/DX INJ NEW DRUG ADDON: CPT

## 2025-07-01 PROCEDURE — 96365 THER/PROPH/DIAG IV INF INIT: CPT

## 2025-07-01 RX ORDER — CYCLOBENZAPRINE HCL 5 MG
5 TABLET ORAL ONCE
Status: COMPLETED | OUTPATIENT
Start: 2025-07-01 | End: 2025-07-01

## 2025-07-01 RX ORDER — PANTOPRAZOLE SODIUM 40 MG/1
40 TABLET, DELAYED RELEASE ORAL
Status: DISPENSED | OUTPATIENT
Start: 2025-07-02

## 2025-07-01 RX ORDER — TALC
3 POWDER (GRAM) TOPICAL NIGHTLY PRN
Status: ACTIVE | OUTPATIENT
Start: 2025-07-01

## 2025-07-01 RX ORDER — ONDANSETRON HYDROCHLORIDE 2 MG/ML
4 INJECTION, SOLUTION INTRAVENOUS EVERY 6 HOURS PRN
Status: DISPENSED | OUTPATIENT
Start: 2025-07-01

## 2025-07-01 RX ORDER — ACETAMINOPHEN 325 MG/1
650 TABLET ORAL EVERY 6 HOURS PRN
Status: DISPENSED | OUTPATIENT
Start: 2025-07-01

## 2025-07-01 RX ORDER — ALUMINUM HYDROXIDE, MAGNESIUM HYDROXIDE, AND SIMETHICONE 1200; 120; 1200 MG/30ML; MG/30ML; MG/30ML
20 SUSPENSION ORAL 4 TIMES DAILY PRN
Status: ACTIVE | OUTPATIENT
Start: 2025-07-01

## 2025-07-01 RX ORDER — TRAMADOL HYDROCHLORIDE 50 MG/1
50 TABLET, FILM COATED ORAL EVERY 6 HOURS PRN
Status: DISCONTINUED | OUTPATIENT
Start: 2025-07-01 | End: 2025-07-02

## 2025-07-01 RX ORDER — MELOXICAM 15 MG/1
15 TABLET ORAL DAILY PRN
Status: ON HOLD | COMMUNITY

## 2025-07-01 RX ORDER — GARLIC 200 MG
1 TABLET ORAL DAILY
Status: ON HOLD | COMMUNITY

## 2025-07-01 RX ADMIN — TRAMADOL HYDROCHLORIDE 50 MG: 50 TABLET, COATED ORAL at 19:01

## 2025-07-01 RX ADMIN — PIPERACILLIN SODIUM AND TAZOBACTAM SODIUM 3.38 G: 3; .375 INJECTION, SOLUTION INTRAVENOUS at 22:33

## 2025-07-01 RX ADMIN — HYDROMORPHONE HYDROCHLORIDE 0.5 MG: 1 INJECTION, SOLUTION INTRAMUSCULAR; INTRAVENOUS; SUBCUTANEOUS at 17:35

## 2025-07-01 RX ADMIN — IOHEXOL 68 ML: 350 INJECTION, SOLUTION INTRAVENOUS at 11:18

## 2025-07-01 RX ADMIN — PIPERACILLIN SODIUM AND TAZOBACTAM SODIUM 3.38 G: 3; .375 INJECTION, SOLUTION INTRAVENOUS at 17:14

## 2025-07-01 RX ADMIN — SODIUM CHLORIDE 1000 ML: 0.9 INJECTION, SOLUTION INTRAVENOUS at 17:35

## 2025-07-01 RX ADMIN — CYCLOBENZAPRINE HYDROCHLORIDE 5 MG: 5 TABLET, FILM COATED ORAL at 22:33

## 2025-07-01 RX ADMIN — ONDANSETRON 4 MG: 2 INJECTION, SOLUTION INTRAMUSCULAR; INTRAVENOUS at 18:47

## 2025-07-01 SDOH — ECONOMIC STABILITY: FOOD INSECURITY
WITHIN THE PAST 12 MONTHS, YOU WORRIED THAT YOUR FOOD WOULD RUN OUT BEFORE YOU GOT THE MONEY TO BUY MORE.: PATIENT DECLINED

## 2025-07-01 SDOH — SOCIAL STABILITY: SOCIAL INSECURITY: WITHIN THE LAST YEAR, HAVE YOU BEEN HUMILIATED OR EMOTIONALLY ABUSED IN OTHER WAYS BY YOUR PARTNER OR EX-PARTNER?: NO

## 2025-07-01 SDOH — ECONOMIC STABILITY: INCOME INSECURITY: IN THE PAST 12 MONTHS HAS THE ELECTRIC, GAS, OIL, OR WATER COMPANY THREATENED TO SHUT OFF SERVICES IN YOUR HOME?: NO

## 2025-07-01 SDOH — SOCIAL STABILITY: SOCIAL INSECURITY: DO YOU FEEL ANYONE HAS EXPLOITED OR TAKEN ADVANTAGE OF YOU FINANCIALLY OR OF YOUR PERSONAL PROPERTY?: NO

## 2025-07-01 SDOH — SOCIAL STABILITY: SOCIAL INSECURITY: ARE THERE ANY APPARENT SIGNS OF INJURIES/BEHAVIORS THAT COULD BE RELATED TO ABUSE/NEGLECT?: NO

## 2025-07-01 SDOH — SOCIAL STABILITY: SOCIAL INSECURITY
WITHIN THE LAST YEAR, HAVE YOU BEEN RAPED OR FORCED TO HAVE ANY KIND OF SEXUAL ACTIVITY BY YOUR PARTNER OR EX-PARTNER?: NO

## 2025-07-01 SDOH — SOCIAL STABILITY: SOCIAL INSECURITY: HAVE YOU HAD ANY THOUGHTS OF HARMING ANYONE ELSE?: NO

## 2025-07-01 SDOH — ECONOMIC STABILITY: FOOD INSECURITY: WITHIN THE PAST 12 MONTHS, THE FOOD YOU BOUGHT JUST DIDN'T LAST AND YOU DIDN'T HAVE MONEY TO GET MORE.: PATIENT DECLINED

## 2025-07-01 SDOH — ECONOMIC STABILITY: TRANSPORTATION INSECURITY: IN THE PAST 12 MONTHS, HAS LACK OF TRANSPORTATION KEPT YOU FROM MEDICAL APPOINTMENTS OR FROM GETTING MEDICATIONS?: NO

## 2025-07-01 SDOH — SOCIAL STABILITY: SOCIAL INSECURITY: DO YOU FEEL UNSAFE GOING BACK TO THE PLACE WHERE YOU ARE LIVING?: NO

## 2025-07-01 SDOH — ECONOMIC STABILITY: HOUSING INSECURITY: IN THE PAST 12 MONTHS, HOW MANY TIMES HAVE YOU MOVED WHERE YOU WERE LIVING?: 0

## 2025-07-01 SDOH — SOCIAL STABILITY: SOCIAL INSECURITY: ABUSE: ADULT

## 2025-07-01 SDOH — SOCIAL STABILITY: SOCIAL INSECURITY: WITHIN THE LAST YEAR, HAVE YOU BEEN AFRAID OF YOUR PARTNER OR EX-PARTNER?: NO

## 2025-07-01 SDOH — SOCIAL STABILITY: SOCIAL INSECURITY: DOES ANYONE TRY TO KEEP YOU FROM HAVING/CONTACTING OTHER FRIENDS OR DOING THINGS OUTSIDE YOUR HOME?: NO

## 2025-07-01 SDOH — SOCIAL STABILITY: SOCIAL INSECURITY: WERE YOU ABLE TO COMPLETE ALL THE BEHAVIORAL HEALTH SCREENINGS?: YES

## 2025-07-01 SDOH — ECONOMIC STABILITY: HOUSING INSECURITY: IN THE LAST 12 MONTHS, WAS THERE A TIME WHEN YOU WERE NOT ABLE TO PAY THE MORTGAGE OR RENT ON TIME?: NO

## 2025-07-01 SDOH — ECONOMIC STABILITY: HOUSING INSECURITY: AT ANY TIME IN THE PAST 12 MONTHS, WERE YOU HOMELESS OR LIVING IN A SHELTER (INCLUDING NOW)?: NO

## 2025-07-01 SDOH — SOCIAL STABILITY: SOCIAL INSECURITY
WITHIN THE LAST YEAR, HAVE YOU BEEN KICKED, HIT, SLAPPED, OR OTHERWISE PHYSICALLY HURT BY YOUR PARTNER OR EX-PARTNER?: NO

## 2025-07-01 SDOH — ECONOMIC STABILITY: FOOD INSECURITY: HOW HARD IS IT FOR YOU TO PAY FOR THE VERY BASICS LIKE FOOD, HOUSING, MEDICAL CARE, AND HEATING?: NOT HARD AT ALL

## 2025-07-01 SDOH — SOCIAL STABILITY: SOCIAL INSECURITY: HAVE YOU HAD THOUGHTS OF HARMING ANYONE ELSE?: NO

## 2025-07-01 SDOH — SOCIAL STABILITY: SOCIAL INSECURITY: HAS ANYONE EVER THREATENED TO HURT YOUR FAMILY OR YOUR PETS?: NO

## 2025-07-01 SDOH — SOCIAL STABILITY: SOCIAL INSECURITY: ARE YOU OR HAVE YOU BEEN THREATENED OR ABUSED PHYSICALLY, EMOTIONALLY, OR SEXUALLY BY ANYONE?: NO

## 2025-07-01 ASSESSMENT — ACTIVITIES OF DAILY LIVING (ADL)
FEEDING YOURSELF: INDEPENDENT
HEARING - LEFT EAR: FUNCTIONAL
LACK_OF_TRANSPORTATION: NO
TOILETING: INDEPENDENT
WALKS IN HOME: INDEPENDENT
ADEQUATE_TO_COMPLETE_ADL: YES
JUDGMENT_ADEQUATE_SAFELY_COMPLETE_DAILY_ACTIVITIES: YES
DRESSING YOURSELF: INDEPENDENT
BATHING: INDEPENDENT
HEARING - RIGHT EAR: FUNCTIONAL
PATIENT'S MEMORY ADEQUATE TO SAFELY COMPLETE DAILY ACTIVITIES?: YES
LACK_OF_TRANSPORTATION: NO
GROOMING: INDEPENDENT

## 2025-07-01 ASSESSMENT — LIFESTYLE VARIABLES
HOW MANY STANDARD DRINKS CONTAINING ALCOHOL DO YOU HAVE ON A TYPICAL DAY: PATIENT DOES NOT DRINK
HOW OFTEN DO YOU HAVE A DRINK CONTAINING ALCOHOL: NEVER
HOW OFTEN DO YOU HAVE 6 OR MORE DRINKS ON ONE OCCASION: NEVER
AUDIT-C TOTAL SCORE: 0
AUDIT-C TOTAL SCORE: 0
SKIP TO QUESTIONS 9-10: 1

## 2025-07-01 ASSESSMENT — PAIN SCALES - GENERAL
PAINLEVEL_OUTOF10: 6
PAINLEVEL_OUTOF10: 7

## 2025-07-01 ASSESSMENT — COGNITIVE AND FUNCTIONAL STATUS - GENERAL
DAILY ACTIVITIY SCORE: 23
PATIENT BASELINE BEDBOUND: NO
DRESSING REGULAR UPPER BODY CLOTHING: A LITTLE
MOBILITY SCORE: 24

## 2025-07-01 ASSESSMENT — PATIENT HEALTH QUESTIONNAIRE - PHQ9
SUM OF ALL RESPONSES TO PHQ9 QUESTIONS 1 & 2: 0
2. FEELING DOWN, DEPRESSED OR HOPELESS: NOT AT ALL
1. LITTLE INTEREST OR PLEASURE IN DOING THINGS: NOT AT ALL

## 2025-07-01 ASSESSMENT — PAIN - FUNCTIONAL ASSESSMENT: PAIN_FUNCTIONAL_ASSESSMENT: 0-10

## 2025-07-01 NOTE — ED TRIAGE NOTES
Sent to ER today for diverticulitis, had CT scan today for abdominal pain ordered by gastroenterologist. Gastro MD instructed patient to come to ER for IV antibiotics and IV hydration. Denies nausea, vomiting, diarrhea.

## 2025-07-01 NOTE — PROGRESS NOTES
Pharmacy Medication History     Source of Information: Per patient     Additional concerns with the patient's PTA list.   Hasn't been able to take medication within the past week or so.     Notified Provider via Haiku : No    The following updates were made to the Prior to Admission medication list:     Medications ADDED:   Meloxicam prn   Garlic otc   Medications CHANGED:  Celebrex is prn   Medications REMOVED:   N/a  Medications NOT TAKING:   N/a    Allergy reviewed : Yes    Meds 2 Beds : Yes    Outpatient pharmacy confirmed and updated in chart : Yes    Pharmacy name: [facility]     The list below reflectives the updated PTA list. Please review each medication in order reconciliation for additional clarification and justification.    Prior to Admission Medications   Prescriptions Last Dose   amLODIPine (Norvasc) 5 mg tablet Past Week   Sig: Take 1 tablet (5 mg) by mouth once daily. Only takes in winter   atorvastatin (Lipitor) 80 mg tablet Past Week   Sig: Take 1 tablet (80 mg) by mouth once daily at bedtime.   calcium carbonate-vit D3-min 600 mg calcium- 400 unit tablet Past Week   Sig: Take 2 tablets by mouth once daily.   celecoxib (CeleBREX) 200 mg capsule Past Week   Sig: Take 1 capsule (200 mg) by mouth 2 times a day.   Patient taking differently: Take 1 capsule (200 mg) by mouth 2 times a day as needed for mild pain (1 - 3).   cholecalciferol (Vitamin D-3) 50 MCG (2000 UT) tablet Past Week   Sig: Take 1 tablet (50 mcg) by mouth once daily.   fish oil concentrate (Omega-3) 120-180 mg capsule Past Week   Sig: Take 2 capsules (2 g) by mouth once daily.   garlic 200 mg tablet Past Week   Sig: Take 1 tablet by mouth once daily.   meloxicam (Mobic) 15 mg tablet    Sig: Take 1 tablet (15 mg) by mouth once daily as needed for mild pain (1 - 3).   multivitamin with minerals (Adults Multivitamin) tablet Past Week   Sig: Take 1 tablet by mouth once daily.   vitamin E acid succinate (vitamin E succinate) 268 mg (400  unit) tablet Past Week   Sig: Take 2 tablets by mouth once daily.      Facility-Administered Medications: None       The list below reflectives the updated allergy list. Please review each documented allergy for additional clarification and justification.    No Known Allergies       07/01/25 at 7:29 PM - Page Vásquez

## 2025-07-01 NOTE — ED TRIAGE NOTES
As provider-in-triage, I performed a medical screening history and physical exam on this patient.  HISTORY OF PRESENT ILLNESS  Patient is a 65-year-old female presenting to ED with concern for abdominal pain.  Pain has been present for last 8 days.  Denies any fever, chills, nausea, vomiting, urinary symptoms.  She is also been constipated.  She had an outpatient CT scan performed this morning which showed complicated diverticulitis.  Patient was instructed to go to the ER for IV antibiotics.     PHYSICAL EXAM  Vital Signs reviewed.  Cardiovascular: Regular rate and rhythm. No m/g/r  Respiratory: No respiratory distress. No accessory muscle use. Breath sounds are present and equal b/l. No adventitious sounds.   Abdomen: Abdomen is soft with no guarding, rigidity, or rebound. No CVA tenderness bilaterally.  LLQ tenderness.       MDM  Workup initiated. Pt stable pending bed availability and further evaluation. Please see subsequent provider note for further details and disposition.         I evaluated this patient in triage with the RN. Due to the patients complaint labs and or imaging were ordered by myself in an attempt to expedite patient care however I am not participating in care after evaluation. This is a preliminary assessment. Pt does not appear in acute distress at this time. They will have a full evaluation as soon as possible. They will be cared for by another provider who will possibly order more labs, imaging or interventions. Pt did not have a full ROS or PE completed by myself however above is a summary with reasons for orders.  For the remainder of the patient's workup and ED course, please refer to the main ED provider note. We discussed need for diagnostic testing including laboratory studies and imaging.  We also discussed that patient may be asked to wait in the waiting room while these tests are pending.  They understand that if they choose to leave without having the testing completed or  resulted that we cannot rule out acute life threatening illnesses and the risks involved could lead to worsening condition, permanent disability or even death.

## 2025-07-01 NOTE — ED PROVIDER NOTES
Emergency Department Provider Note       History of Present Illness     History provided by: Patient  Limitations to History: None  External Records Reviewed with Brief Summary: Patient with history of hyperlipidemia, prediabetes, Raynaud's, coronary artery spasm and diverticulitis    HPI:  Karyna Noguera is a 65 y.o. female presents to the emergency department with abdominal pain.  She reports abdominal pain has been present for 8 days.  She reports nausea without vomiting.  The abdominal pain is in the lower abdomen.  She reports difficulty moving her bowels due to pain.  She denies blood in her stools.  She denies fevers and chills.  She spoke to her gastroenterologist who ordered a CT scan.  This was completed today.  She was then instructed to present to the emergency department for treatment.    Physical Exam   Triage vitals:  T 36.7 °C (98.1 °F)  HR 98  /80  RR 18  O2 96 %      Physical Exam  Vitals and nursing note reviewed.   HENT:      Head: Normocephalic and atraumatic.      Nose: Nose normal.   Eyes:      Conjunctiva/sclera: Conjunctivae normal.   Cardiovascular:      Rate and Rhythm: Normal rate and regular rhythm.      Pulses: Normal pulses.      Heart sounds: Normal heart sounds.   Pulmonary:      Effort: Pulmonary effort is normal.      Breath sounds: Normal breath sounds.   Abdominal:      General: Bowel sounds are normal.      Palpations: Abdomen is soft.      Tenderness: There is abdominal tenderness in the suprapubic area and left lower quadrant.   Musculoskeletal:         General: Normal range of motion.      Cervical back: Normal range of motion and neck supple.   Skin:     Findings: No rash.   Neurological:      General: No focal deficit present.      Mental Status: She is alert and oriented to person, place, and time.   Psychiatric:         Mood and Affect: Mood normal.           Medical Decision Making & ED Course   Medical Decision Makin y.o. female presents to the  emergency department with abdominal pain.  CT scan performed as an outpatient showed diverticulitis with multiple intramural abscesses.  The patient's labs were unremarkable.  Urinalysis shows ketones.  She will be treated with IV fluids and IV Zosyn.  She requires admission for further evaluation and management.  ----      Differential diagnoses considered include but are not limited to: Gastritis, gastroenteritis, GERD, food poisoning, ileus, bowel obstruction, hernia, acute appendicitis, cholecystitis, diverticulitis, colitis, renal colic.    Social Determinants of Health which Significantly Impact Care: Social Determinants of Health which Significantly Impact Care: None identified     EKG Independent Interpretation: EKG not obtained    Independent Result Review and Interpretation: Relevant laboratory and radiographic results were reviewed and independently interpreted by myself.  As necessary, they are commented on in the ED Course.    Chronic conditions affecting the patient's care: As documented above in [facility]    The patient was discussed with the following consultants/services: Dr. Grace and Hospitalist/Admitting Provider who accepted the patient for admission    Care Considerations: As documented above in [facility]    ED Course:  Diagnoses as of 07/01/25 1716   Colonic diverticular abscess       Disposition   As a result of their workup, the patient will require admission to the hospital.  The patient was informed of her diagnosis.  The patient was given the opportunity to ask questions and I answered them. The patient agreed to be admitted to the hospital.    Procedures   Procedures        Quinn Henriquez MD  Emergency Medicine                                                       Quinn Henriquez MD  07/01/25 1721

## 2025-07-02 LAB
ANION GAP SERPL CALC-SCNC: 14 MMOL/L (ref 10–20)
BUN SERPL-MCNC: 9 MG/DL (ref 6–23)
CALCIUM SERPL-MCNC: 8.4 MG/DL (ref 8.6–10.3)
CHLORIDE SERPL-SCNC: 104 MMOL/L (ref 98–107)
CO2 SERPL-SCNC: 23 MMOL/L (ref 21–32)
CREAT SERPL-MCNC: 0.56 MG/DL (ref 0.5–1.05)
EGFRCR SERPLBLD CKD-EPI 2021: >90 ML/MIN/1.73M*2
ERYTHROCYTE [DISTWIDTH] IN BLOOD BY AUTOMATED COUNT: 15.1 % (ref 11.5–14.5)
GLUCOSE SERPL-MCNC: 92 MG/DL (ref 74–99)
HCT VFR BLD AUTO: 36.8 % (ref 36–46)
HGB BLD-MCNC: 11.8 G/DL (ref 12–16)
HOLD SPECIMEN: NORMAL
MCH RBC QN AUTO: 27.6 PG (ref 26–34)
MCHC RBC AUTO-ENTMCNC: 32.1 G/DL (ref 32–36)
MCV RBC AUTO: 86 FL (ref 80–100)
NRBC BLD-RTO: 0 /100 WBCS (ref 0–0)
PLATELET # BLD AUTO: 289 X10*3/UL (ref 150–450)
POTASSIUM SERPL-SCNC: 3.8 MMOL/L (ref 3.5–5.3)
RBC # BLD AUTO: 4.28 X10*6/UL (ref 4–5.2)
SODIUM SERPL-SCNC: 137 MMOL/L (ref 136–145)
WBC # BLD AUTO: 10.1 X10*3/UL (ref 4.4–11.3)

## 2025-07-02 PROCEDURE — 2500000004 HC RX 250 GENERAL PHARMACY W/ HCPCS (ALT 636 FOR OP/ED): Performed by: INTERNAL MEDICINE

## 2025-07-02 PROCEDURE — 80048 BASIC METABOLIC PNL TOTAL CA: CPT | Performed by: INTERNAL MEDICINE

## 2025-07-02 PROCEDURE — 85027 COMPLETE CBC AUTOMATED: CPT | Performed by: INTERNAL MEDICINE

## 2025-07-02 PROCEDURE — 2500000004 HC RX 250 GENERAL PHARMACY W/ HCPCS (ALT 636 FOR OP/ED): Performed by: NURSE PRACTITIONER

## 2025-07-02 PROCEDURE — 2500000001 HC RX 250 WO HCPCS SELF ADMINISTERED DRUGS (ALT 637 FOR MEDICARE OP): Performed by: INTERNAL MEDICINE

## 2025-07-02 PROCEDURE — 1100000001 HC PRIVATE ROOM DAILY

## 2025-07-02 PROCEDURE — 99222 1ST HOSP IP/OBS MODERATE 55: CPT | Performed by: INTERNAL MEDICINE

## 2025-07-02 PROCEDURE — 36415 COLL VENOUS BLD VENIPUNCTURE: CPT | Performed by: INTERNAL MEDICINE

## 2025-07-02 PROCEDURE — 99221 1ST HOSP IP/OBS SF/LOW 40: CPT | Performed by: STUDENT IN AN ORGANIZED HEALTH CARE EDUCATION/TRAINING PROGRAM

## 2025-07-02 RX ORDER — MORPHINE SULFATE 2 MG/ML
2 INJECTION, SOLUTION INTRAMUSCULAR; INTRAVENOUS EVERY 4 HOURS PRN
Status: DISCONTINUED | OUTPATIENT
Start: 2025-07-02 | End: 2025-07-02

## 2025-07-02 RX ORDER — ENOXAPARIN SODIUM 100 MG/ML
40 INJECTION SUBCUTANEOUS EVERY 24 HOURS
Status: ACTIVE | OUTPATIENT
Start: 2025-07-02

## 2025-07-02 RX ORDER — SODIUM CHLORIDE 9 MG/ML
100 INJECTION, SOLUTION INTRAVENOUS CONTINUOUS
Status: ACTIVE | OUTPATIENT
Start: 2025-07-02 | End: 2025-07-03

## 2025-07-02 RX ORDER — POLYETHYLENE GLYCOL 3350 17 G/17G
17 POWDER, FOR SOLUTION ORAL 2 TIMES DAILY
Status: DISPENSED | OUTPATIENT
Start: 2025-07-02

## 2025-07-02 RX ORDER — AMOXICILLIN 250 MG
1 CAPSULE ORAL 2 TIMES DAILY
Status: DISPENSED | OUTPATIENT
Start: 2025-07-02

## 2025-07-02 RX ORDER — ATORVASTATIN CALCIUM 80 MG/1
80 TABLET, FILM COATED ORAL NIGHTLY
Status: DISPENSED | OUTPATIENT
Start: 2025-07-02

## 2025-07-02 RX ORDER — AMLODIPINE BESYLATE 5 MG/1
5 TABLET ORAL DAILY
Status: DISPENSED | OUTPATIENT
Start: 2025-07-02

## 2025-07-02 RX ORDER — HYDROMORPHONE HYDROCHLORIDE 0.2 MG/ML
0.2 INJECTION INTRAMUSCULAR; INTRAVENOUS; SUBCUTANEOUS
Status: DISCONTINUED | OUTPATIENT
Start: 2025-07-02 | End: 2025-07-02

## 2025-07-02 RX ORDER — MORPHINE SULFATE 2 MG/ML
1 INJECTION, SOLUTION INTRAMUSCULAR; INTRAVENOUS EVERY 4 HOURS PRN
Status: DISCONTINUED | OUTPATIENT
Start: 2025-07-02 | End: 2025-07-03

## 2025-07-02 RX ORDER — CHOLECALCIFEROL (VITAMIN D3) 25 MCG
50 TABLET ORAL DAILY
Status: DISPENSED | OUTPATIENT
Start: 2025-07-02

## 2025-07-02 RX ORDER — BISACODYL 10 MG/1
10 SUPPOSITORY RECTAL DAILY
Status: DISPENSED | OUTPATIENT
Start: 2025-07-02

## 2025-07-02 RX ADMIN — SODIUM CHLORIDE 100 ML/HR: 0.9 INJECTION, SOLUTION INTRAVENOUS at 12:03

## 2025-07-02 RX ADMIN — PANTOPRAZOLE SODIUM 40 MG: 40 TABLET, DELAYED RELEASE ORAL at 06:08

## 2025-07-02 RX ADMIN — SENNOSIDES AND DOCUSATE SODIUM 1 TABLET: 50; 8.6 TABLET ORAL at 12:04

## 2025-07-02 RX ADMIN — ACETAMINOPHEN 650 MG: 325 TABLET ORAL at 08:39

## 2025-07-02 RX ADMIN — MORPHINE SULFATE 2 MG: 2 INJECTION, SOLUTION INTRAMUSCULAR; INTRAVENOUS at 08:37

## 2025-07-02 RX ADMIN — PIPERACILLIN SODIUM AND TAZOBACTAM SODIUM 3.38 G: 3; .375 INJECTION, SOLUTION INTRAVENOUS at 22:10

## 2025-07-02 RX ADMIN — HYDROMORPHONE HYDROCHLORIDE 0.2 MG: 0.2 INJECTION, SOLUTION INTRAMUSCULAR; INTRAVENOUS; SUBCUTANEOUS at 16:09

## 2025-07-02 RX ADMIN — Medication 50 MCG: at 08:39

## 2025-07-02 RX ADMIN — PIPERACILLIN SODIUM AND TAZOBACTAM SODIUM 3.38 G: 3; .375 INJECTION, SOLUTION INTRAVENOUS at 12:04

## 2025-07-02 RX ADMIN — MORPHINE SULFATE 1 MG: 2 INJECTION, SOLUTION INTRAMUSCULAR; INTRAVENOUS at 22:08

## 2025-07-02 RX ADMIN — PIPERACILLIN SODIUM AND TAZOBACTAM SODIUM 3.38 G: 3; .375 INJECTION, SOLUTION INTRAVENOUS at 06:08

## 2025-07-02 RX ADMIN — POLYETHYLENE GLYCOL 3350 17 G: 17 POWDER, FOR SOLUTION ORAL at 12:04

## 2025-07-02 RX ADMIN — ATORVASTATIN CALCIUM 80 MG: 80 TABLET, FILM COATED ORAL at 22:08

## 2025-07-02 RX ADMIN — MORPHINE SULFATE 2 MG: 2 INJECTION, SOLUTION INTRAMUSCULAR; INTRAVENOUS at 13:13

## 2025-07-02 RX ADMIN — TRAMADOL HYDROCHLORIDE 50 MG: 50 TABLET, COATED ORAL at 06:08

## 2025-07-02 RX ADMIN — SENNOSIDES AND DOCUSATE SODIUM 1 TABLET: 50; 8.6 TABLET ORAL at 22:08

## 2025-07-02 RX ADMIN — AMLODIPINE BESYLATE 5 MG: 5 TABLET ORAL at 08:39

## 2025-07-02 RX ADMIN — BISACODYL 10 MG: 10 SUPPOSITORY RECTAL at 22:09

## 2025-07-02 SDOH — ECONOMIC STABILITY: FOOD INSECURITY: HOW HARD IS IT FOR YOU TO PAY FOR THE VERY BASICS LIKE FOOD, HOUSING, MEDICAL CARE, AND HEATING?: NOT HARD AT ALL

## 2025-07-02 SDOH — ECONOMIC STABILITY: HOUSING INSECURITY: IN THE LAST 12 MONTHS, WAS THERE A TIME WHEN YOU WERE NOT ABLE TO PAY THE MORTGAGE OR RENT ON TIME?: NO

## 2025-07-02 SDOH — HEALTH STABILITY: MENTAL HEALTH: HOW OFTEN DO YOU HAVE SIX OR MORE DRINKS ON ONE OCCASION?: NEVER

## 2025-07-02 SDOH — HEALTH STABILITY: MENTAL HEALTH: HOW OFTEN DO YOU HAVE A DRINK CONTAINING ALCOHOL?: 2-3 TIMES A WEEK

## 2025-07-02 SDOH — SOCIAL STABILITY: SOCIAL NETWORK: HOW OFTEN DO YOU ATTEND CHURCH OR RELIGIOUS SERVICES?: NEVER

## 2025-07-02 SDOH — SOCIAL STABILITY: SOCIAL NETWORK
IN A TYPICAL WEEK, HOW MANY TIMES DO YOU TALK ON THE PHONE WITH FAMILY, FRIENDS, OR NEIGHBORS?: MORE THAN THREE TIMES A WEEK

## 2025-07-02 SDOH — HEALTH STABILITY: MENTAL HEALTH: HOW MANY DRINKS CONTAINING ALCOHOL DO YOU HAVE ON A TYPICAL DAY WHEN YOU ARE DRINKING?: 1 OR 2

## 2025-07-02 SDOH — HEALTH STABILITY: MENTAL HEALTH
DO YOU FEEL STRESS - TENSE, RESTLESS, NERVOUS, OR ANXIOUS, OR UNABLE TO SLEEP AT NIGHT BECAUSE YOUR MIND IS TROUBLED ALL THE TIME - THESE DAYS?: ONLY A LITTLE

## 2025-07-02 SDOH — SOCIAL STABILITY: SOCIAL INSECURITY: WITHIN THE LAST YEAR, HAVE YOU BEEN HUMILIATED OR EMOTIONALLY ABUSED IN OTHER WAYS BY YOUR PARTNER OR EX-PARTNER?: NO

## 2025-07-02 SDOH — HEALTH STABILITY: PHYSICAL HEALTH: ON AVERAGE, HOW MANY MINUTES DO YOU ENGAGE IN EXERCISE AT THIS LEVEL?: 0 MIN

## 2025-07-02 SDOH — ECONOMIC STABILITY: HOUSING INSECURITY: AT ANY TIME IN THE PAST 12 MONTHS, WERE YOU HOMELESS OR LIVING IN A SHELTER (INCLUDING NOW)?: NO

## 2025-07-02 SDOH — ECONOMIC STABILITY: FOOD INSECURITY: WITHIN THE PAST 12 MONTHS, THE FOOD YOU BOUGHT JUST DIDN'T LAST AND YOU DIDN'T HAVE MONEY TO GET MORE.: NEVER TRUE

## 2025-07-02 SDOH — ECONOMIC STABILITY: FOOD INSECURITY: WITHIN THE PAST 12 MONTHS, YOU WORRIED THAT YOUR FOOD WOULD RUN OUT BEFORE YOU GOT THE MONEY TO BUY MORE.: NEVER TRUE

## 2025-07-02 SDOH — SOCIAL STABILITY: SOCIAL INSECURITY: WITHIN THE LAST YEAR, HAVE YOU BEEN AFRAID OF YOUR PARTNER OR EX-PARTNER?: NO

## 2025-07-02 SDOH — HEALTH STABILITY: PHYSICAL HEALTH
HOW OFTEN DO YOU NEED TO HAVE SOMEONE HELP YOU WHEN YOU READ INSTRUCTIONS, PAMPHLETS, OR OTHER WRITTEN MATERIAL FROM YOUR DOCTOR OR PHARMACY?: NEVER

## 2025-07-02 SDOH — SOCIAL STABILITY: SOCIAL NETWORK: HOW OFTEN DO YOU ATTEND MEETINGS OF THE CLUBS OR ORGANIZATIONS YOU BELONG TO?: NEVER

## 2025-07-02 SDOH — SOCIAL STABILITY: SOCIAL NETWORK: HOW OFTEN DO YOU GET TOGETHER WITH FRIENDS OR RELATIVES?: TWICE A WEEK

## 2025-07-02 SDOH — SOCIAL STABILITY: SOCIAL NETWORK
DO YOU BELONG TO ANY CLUBS OR ORGANIZATIONS SUCH AS CHURCH GROUPS, UNIONS, FRATERNAL OR ATHLETIC GROUPS, OR SCHOOL GROUPS?: NO

## 2025-07-02 SDOH — SOCIAL STABILITY: SOCIAL INSECURITY: ARE YOU MARRIED, WIDOWED, DIVORCED, SEPARATED, NEVER MARRIED, OR LIVING WITH A PARTNER?: MARRIED

## 2025-07-02 SDOH — ECONOMIC STABILITY: INCOME INSECURITY: IN THE PAST 12 MONTHS HAS THE ELECTRIC, GAS, OIL, OR WATER COMPANY THREATENED TO SHUT OFF SERVICES IN YOUR HOME?: NO

## 2025-07-02 SDOH — HEALTH STABILITY: PHYSICAL HEALTH: ON AVERAGE, HOW MANY DAYS PER WEEK DO YOU ENGAGE IN MODERATE TO STRENUOUS EXERCISE (LIKE A BRISK WALK)?: 0 DAYS

## 2025-07-02 SDOH — ECONOMIC STABILITY: TRANSPORTATION INSECURITY: IN THE PAST 12 MONTHS, HAS LACK OF TRANSPORTATION KEPT YOU FROM MEDICAL APPOINTMENTS OR FROM GETTING MEDICATIONS?: NO

## 2025-07-02 ASSESSMENT — ACTIVITIES OF DAILY LIVING (ADL)
LACK_OF_TRANSPORTATION: NO
LACK_OF_TRANSPORTATION: NO

## 2025-07-02 ASSESSMENT — PAIN DESCRIPTION - DESCRIPTORS
DESCRIPTORS: ACHING;SHARP;SHOOTING
DESCRIPTORS: CRAMPING;ACHING
DESCRIPTORS: CRAMPING;ACHING;STABBING
DESCRIPTORS: CRUSHING;SHARP

## 2025-07-02 ASSESSMENT — PAIN - FUNCTIONAL ASSESSMENT
PAIN_FUNCTIONAL_ASSESSMENT: 0-10

## 2025-07-02 ASSESSMENT — LIFESTYLE VARIABLES
AUDIT-C TOTAL SCORE: 3
SKIP TO QUESTIONS 9-10: 1

## 2025-07-02 ASSESSMENT — PAIN DESCRIPTION - LOCATION
LOCATION: ABDOMEN

## 2025-07-02 ASSESSMENT — PAIN SCALES - GENERAL
PAINLEVEL_OUTOF10: 10 - WORST POSSIBLE PAIN
PAINLEVEL_OUTOF10: 5 - MODERATE PAIN
PAINLEVEL_OUTOF10: 7
PAINLEVEL_OUTOF10: 9
PAINLEVEL_OUTOF10: 6
PAINLEVEL_OUTOF10: 6

## 2025-07-02 NOTE — PROGRESS NOTES
Karyna Noguera is a 65 y.o. female on day 1 of admission presenting with Colonic diverticular abscess.    Plan: admitted from home for colonic diverticular abscess, receiving IV abx/IVF/pain medications. Consult placed for surgery. Spoke with patient's spouse who states patient is independent with care, no needs when she is ready for DC home.   Disposition: Home with   Barrier: pain control, iv abx, surgery consult  ADOD:  2-4 days       07/02/25 1029   Discharge Planning   Living Arrangements Spouse/significant other   Support Systems Spouse/significant other;Friends/neighbors   Assistance Needed independent, drives, retired   Type of Residence Private residence   Number of Stairs to Enter Residence 3   Number of Stairs Within Residence 0   Do you have animals or pets at home? No   Who is requesting discharge planning? Provider   Home or Post Acute Services None   Expected Discharge Disposition Home   Does the patient need discharge transport arranged? No   Financial Resource Strain   How hard is it for you to pay for the very basics like food, housing, medical care, and heating? Not hard   Housing Stability   In the last 12 months, was there a time when you were not able to pay the mortgage or rent on time? N   At any time in the past 12 months, were you homeless or living in a shelter (including now)? N   Transportation Needs   In the past 12 months, has lack of transportation kept you from medical appointments or from getting medications? no   In the past 12 months, has lack of transportation kept you from meetings, work, or from getting things needed for daily living? No   Patient Choice   Patient / Family choosing to utilize agency / facility established prior to hospitalization No   Stroke Family Assessment   Stroke Family Assessment Needed No   Intensity of Service   Intensity of Service >30 min       Jodee De La Garza RN

## 2025-07-02 NOTE — H&P
Karyna Noguera is a 65 y.o. female   HPI   Patient with a past medical history of hypertension dyslipidemia osteoarthritis was doing well until a few days ago when she started having abdominal pain and cramping  The pain worsened which prompted her to come to the emergency room where imaging showed acute diverticulitis with abscess    Patient says she has had 1 prior episode that was similar to this but she did not come to the hospital for    Last colonoscopy was in 2023 and a polyp was removed patient denies any fever chills nausea or vomiting      Past Medical History  Medical History[1]    Surgical History  Surgical History[2]     Social History  She reports that she quit smoking about 40 years ago. Her smoking use included cigarettes. She started smoking about 50 years ago. She has a 15 pack-year smoking history. She has never used smokeless tobacco. She reports current alcohol use of about 14.0 standard drinks of alcohol per week. She reports that she does not currently use drugs.    Family History  Family History[3]     Allergies  Patient has no known allergies.    Review of Systems     Constitutional: not feeling poorly, no fever, no recent weight gain and no recent weight loss.   Eyes: no blurred vision and no diplopia.   ENT: no hearing loss, no tinnitus, no earache, no sore throat, no hoarseness and no swollen glands in the neck.   Cardiovascular: no chest pain, no tightness or heavy pressure, no shortness of breath, no palpitations and no lower extremity edema.   Respiratory: no cough, wheezing or shortness of breath at rest or exertion  Gastrointestinal: no change in bowel habits, no diarrhea, no constipation, no bloody stools,no signs and symptoms of ulcer disease, no cesilia colored stools and no intolerance to fatty foods.   Genitourinary: no urinary frequency, no dysuria, no hematuria, no burning sensation during urination, urinary stream is not smaller and urinary stream does not start and stop.    Musculoskeletal: no arthralgias, no joint stiffness, no muscle weakness, no back pain and no difficulty walking.   Skin: no rashes, no change in skin color and pigmentation, no skin lesions and no skin lumps.   Neurological: no headaches, no dizziness, no seizures, no tingling, no numbness, no signs and symptoms of stroke and no limb weakness.   Psychiatric: no confusion, no memory lapses or loss, no depression and no sleep disturbances.   Endocrine: no goiter, no thyroid disorder, no diabetes mellitus, no excessive thirst, no dry skin, no cold intolerance, no heat intolerance and no increased urinary frequency.   Hematologic/Lymphatic: is not slow to heal, does not bleed easily, does not bruise easily, no thrombophlebitis, no anemia and no history of blood transfusion.   All other systems have been reviewed and are negative for complaint.     Vitals:    07/02/25 0743   BP: 112/75   Pulse: 79   Resp: 16   Temp: 37 °C (98.6 °F)   SpO2: 94%        Scheduled medications  Scheduled Medications[4]  Continuous medications  Continuous Medications[5]  PRN medications  PRN Medications[6]    Results from last 7 days   Lab Units 07/02/25  0814 07/01/25  1407   WBC AUTO x10*3/uL 10.1 11.2   HEMOGLOBIN g/dL 11.8* 13.4   HEMATOCRIT % 36.8 42.5   PLATELETS AUTO x10*3/uL 289 308     Results from last 7 days   Lab Units 07/02/25  0814 07/01/25  1407 06/30/25  1109   SODIUM mmol/L 137 135*  --    POTASSIUM mmol/L 3.8 3.9  --    CHLORIDE mmol/L 104 99  --    CO2 mmol/L 23 22  --    BUN mg/dL 9 11  --    CREATININE mg/dL 0.56 0.53 0.52   CALCIUM mg/dL 8.4* 8.7  --    PROTEIN TOTAL g/dL  --  6.5  --    BILIRUBIN TOTAL mg/dL  --  0.5  --    ALK PHOS U/L  --  113  --    ALT U/L  --  24  --    AST U/L  --  24  --    GLUCOSE mg/dL 92 105*  --             No orders to display       Physical Exam      Constitutional   General appearance: Alert and in no acute distress.   Eyes   Inspection of eyes: Sclera and conjunctiva were normal.     Pupil exam: Pupils were equal in size. Extraocular movements were intact.   Pulmonary   Respiratory assessment: No respiratory distress, normal respiratory rhythm and effort.    Auscultation of Lungs: Clear bilateral breath sounds.   Cardiovascular   Auscultation of heart: Apical pulse normal, heart rate and rhythm normal, normal S1 and S2, no murmurs and no pericardial rub.    Exam for edema: No peripheral edema.   Abdomen   Abdominal Exam: Left lower quadrant tenderness  Liver and Spleen exam: No hepato-splenomegaly.   Musculoskeletal     Inspection/palpation of joints, bones and muscles: No joint swelling. Normal movement of all extremities.   Skin   Skin inspection: Normal skin color and pigmentation, normal skin turgor and no visible rash.   Neurologic   Cranial nerves: Nerves 2-12 were intact, no focal neuro defects.   Psychiatric   Orientation: Oriented to person, place, and time.    Mood and affect: Normal.      Assessment/Plan      #Acute diverticulitis with intramural abscesses  Started IV Zosyn  Antiemetics/pain control  Will get surgery on board but likely will respond to antibiotics    #Hypertension  Stable continue home medications    #Dyslipidemia  Stable continue home medications       [1]   Past Medical History:  Diagnosis Date    Achilles tendinitis, unspecified leg 06/26/2019    Achilles tendon pain    Arthritis     GERD (gastroesophageal reflux disease)     Inflammatory bowel disease     Lumbosacral disc disease     Other abnormal glucose 11/18/2018    Elevated glucose    Other shoulder lesions, right shoulder 08/12/2019    Right rotator cuff tendinitis    Pain in right shoulder 07/15/2019    Right shoulder pain    Personal history of other diseases of the female genital tract     History of endometriosis    Strain of unspecified muscle, fascia and tendon at shoulder and upper arm level, right arm, initial encounter 07/15/2019    Strain of shoulder, right    Varicella    [2]   Past Surgical  History:  Procedure Laterality Date    APPENDECTOMY  05/21/2018    CARDIAC CATHETERIZATION      HYSTERECTOMY  05/21/2018    endometriosis    OTHER SURGICAL HISTORY  09/20/2021    Laparoscopy--for abdominal pain    REFRACTIVE SURGERY  06/13/2018    Corneal LASIK    TONSILLECTOMY  05/21/2018    Tonsillectomy   [3]   Family History  Problem Relation Name Age of Onset    Stomach cancer Mother Elisha     Ovarian cancer Mother Elisha     Miscarriages / Stillbirths Mother Elisha     Cancer Mother Elisha     Colon cancer Brother      Throat cancer Brother      Diabetes Father Bonifacio     Cancer Brother Ed     Colon cancer Brother Ed     Throat cancer Brother Ed     Colon cancer Brother Ed     Cancer Brother Ed     Colon cancer Brother Ed    [4] amLODIPine, 5 mg, oral, Daily  atorvastatin, 80 mg, oral, Nightly  cholecalciferol, 50 mcg, oral, Daily  pantoprazole, 40 mg, oral, Daily before breakfast  piperacillin-tazobactam, 3.375 g, intravenous, q6h  polyethylene glycol, 17 g, oral, BID  sennosides-docusate sodium, 1 tablet, oral, BID    [5] sodium chloride 0.9%, 100 mL/hr    [6] PRN medications: acetaminophen, alum-mag hydroxide-simeth, melatonin, morphine, ondansetron, traMADol

## 2025-07-02 NOTE — PROGRESS NOTES
"Subjective:  Patient was complaining of abdominal pain on assessment. She mentioned her pain is mostly on LLQ but she also feels it in her entire lower abdomen and it is sharp, constant pain.   Patient denies recent fever, chills, chest pain, palpations, leg edema, SOB, cough, abd pain, urinary sx, bloody vomit or stools, headaches, lightheadedness or syncope, weakness, or trauma/travel/sick contacts.  Patient denies recent fever, chills, chest pain, palpations, leg edema, SOB, cough, urinary sx, bloody vomit or stools, headaches, lightheadedness or syncope, weakness, or trauma/travel/sick contacts.      Vitals (Last 24 Hours):  Heart Rate:  [71-98]   Temp:  [36.7 °C (98.1 °F)-37 °C (98.6 °F)]   Resp:  [15-18]   BP: (112-140)/(67-80)   Height:  [165.1 cm (5' 5\")]   Weight:  [81.6 kg (180 lb)]   SpO2:  [93 %-99 %]       I have reviewed imaging reports and labs from this visit    PHYSICAL EXAM:  Constitutional: NAD, alert and cooperative  Eyes: no icterus  ENMT: mucous membranes moist, no lesions  Head/Neck: supple  Respiratory/Thorax: CTA bilaterally, non-labored breathing, no cough, on RA  Cardiovascular: RRR, no murmurs heard  Gastrointestinal: ND/S/NT  : no Bernard, no SP/flank discomfort  Musculoskeletal: no joint swelling, ROM intact  Extremities: no edema  Neurological: non-focal  Skin: warm and dry  Psych: calm, stable mood     MEDS:  Scheduled meds  Scheduled Medications[1]    Continuous meds  Continuous Medications[2]    PRN meds  PRN Medications[3]      ASSESSMENT/PLAN:  Karyna Noguera is a 65 y.o. female with PMH of Coronary artery spasm, HLD, Raynaud's phenomenon, Fatty liver, Prediabetes presents to the emergency department with abdominal pain.  She reports abdominal pain has been present for 8 days.  She reports nausea without vomiting.  The abdominal pain is in the lower abdomen.  She reports difficulty moving her bowels due to pain.  She denies blood in her stools.  She denies fevers and chills.  She " spoke to her gastroenterologist who ordered a CT scan.  This was completed today.  She was then instructed to present to the emergency department for treatment.     Diverticulitis with intramural abscesses  LLQ Abdominal Pain  CT scan on 07/01/25 showed: Left colon and sigmoid diverticulosis with an area of acute  diverticulitis in the mid sigmoid, including adjacent fat stranding,  wall thickening, and multiple small apparently intramural abscesses.  Subtle 15 x 12 mm rounded hypodensity inferiorly in segment of the  liver. Differential considerations include hemangioma, focal fatty  replacement, and less likely hepatic abscess. Consider liver MRI in  follow-up.  - VI fluids  - PRN pain medication, antihelmintic  - IV antibiotics Zosyn  - ordered bowel regimen for Moderate diffuse retained colonic stool  found on CT scan  - General Surgery saw the Patient  -  Gen Surgery suspect's that Patient will respond well to IV antibiotics without need for surgery or IR drainage   -  Patient started on full liquid diet  - Trend labs, abdominal exam  - Rec Colonoscopy in 6 weeks     Acute Abdominal Pain  Constipation  - Patient c/o constant sharp pain in her abdomen and pain medication was not helping control her pain  - PRN pain medication changed from Morphine to Dilaudid  - Aggressive Bowel regimen ordered    Coronary artery spasm  -     Tx by cardiology    Hyperlipidemia, unspecified hyperlipidemia type  - Continue Atorvastatin     Fatty liver  -     Hepatic function panel wnl  -     US abdomen 4/25/25 showed Hepatic steatosis without focal lesion     Prediabetes  -     Hemoglobin A1C; Future  -      BG remain wnl    Other comorbidities as above  -continue medications as ordered and adjust based on clinical course     VTE / GI prophylaxis   -subcutaneous Lovenox on hold d/t intramural abscesses, SCD ordered, PPI, bowel regimen in place     Discharge planning  - Patient to discharge home on PO antibiotics once medically  ready    Discussed with Dr. Ramsey and the interdisciplinary team     Catrina Carmona, APRN-CNP         [1] amLODIPine, 5 mg, oral, Daily  atorvastatin, 80 mg, oral, Nightly  cholecalciferol, 50 mcg, oral, Daily  pantoprazole, 40 mg, oral, Daily before breakfast  piperacillin-tazobactam, 3.375 g, intravenous, q6h    [2] sodium chloride 0.9%, 100 mL/hr    [3] PRN medications: acetaminophen, alum-mag hydroxide-simeth, melatonin, morphine, ondansetron, traMADol

## 2025-07-02 NOTE — CARE PLAN
The patient's goals for the shift include      The clinical goals for the shift include pt will have pain controlled this shift

## 2025-07-02 NOTE — CONSULTS
GENERAL SURGERY CONSULT NOTE    Reason For Consult  Diverticulitis     History Of Present Illness  Karyna Noguera is a 65 y.o. female with PMH of HTN, HLD, GERD, IBS, preDM, raynauds, fatty liver presenting with lower abdominal pain since Monday 7/23. Pain started as lower abdominal discomfort progressively developed severe intermittent campy type pain that was primarily in LLQ. Endorses loss of appetite, emesis x 1 Saturday, reports good hydration. Last BM was 2 days ago and was small. Last colonoscopy was in 2023 noted diverticulosis of sigmoid colon.  they removed one polyp, noted hemorrhoids. She thinks she's had several flares before, she reports twice a year shell have similar pain that usually resolves in a few days without antibiotics or seeing a doctor.      Past Medical History  She has a past medical history of Achilles tendinitis, unspecified leg (06/26/2019), Arthritis, GERD (gastroesophageal reflux disease), Inflammatory bowel disease, Lumbosacral disc disease, Other abnormal glucose (11/18/2018), Other shoulder lesions, right shoulder (08/12/2019), Pain in right shoulder (07/15/2019), Personal history of other diseases of the female genital tract, Strain of unspecified muscle, fascia and tendon at shoulder and upper arm level, right arm, initial encounter (07/15/2019), and Varicella.    Surgical History  She has a past surgical history that includes Tonsillectomy (05/21/2018); Appendectomy (05/21/2018); Hysterectomy (05/21/2018); Other surgical history (09/20/2021); Refractive surgery (06/13/2018); and Cardiac catheterization.     Social History  She reports that she quit smoking about 40 years ago. Her smoking use included cigarettes. She started smoking about 50 years ago. She has a 15 pack-year smoking history. She has never used smokeless tobacco. She reports current alcohol use of about 14.0 standard drinks of alcohol per week. She reports that she does not currently use drugs.    Family  "History  Family History[1]     Allergies  Patient has no known allergies.    Review of Systems    All systems have been reviewed and are negative except for what is mentioned in the HPI.           Physical Exam  Constitutional: A&Ox3, calm and cooperative, NAD  Eyes: EOMI, clear sclera   Cardiovascular: Normal rate and regular rhythm  Respiratory/Thorax: CTAB, on RA  Gastrointestinal: abd soft, mildly distended, TTP in LLQ, nonperitonitic   Genitourinary: Voiding independently   Musculoskeletal: ROM intact  Extremities: No peripheral edema  Neurological: A&Ox3, No focal deficits   Psychological: Appropriate mood and behavior           Last Recorded Vitals  Vitals:    07/01/25 2102 07/02/25 0107 07/02/25 0743 07/02/25 1100   BP: 125/75 114/67 112/75 114/70   BP Location: Right arm Right arm Right arm Right arm   Patient Position: Lying Lying Lying Lying   Pulse: 72 71 79 77   Resp: 16 16 16 16   Temp: 37 °C (98.6 °F) 36.8 °C (98.3 °F) 37 °C (98.6 °F) 37 °C (98.6 °F)   TempSrc: Temporal Temporal Temporal Temporal   SpO2:  93% 94% 95%   Weight: 81.6 kg (180 lb)      Height: 1.651 m (5' 5\")            Relevant Results    Imaging:     .=== 04/07/25 ===    XR SHOULDER 2+ VIEWS RIGHT    - Impression -  Degenerative changes of the right shoulder as described above.    No acute fracture or dislocation.      MACRO:  None    Signed by: Yoel Miller 4/8/2025 12:45 PM  Dictation workstation:   [id number]   .=== 07/01/25 ===    CT ABDOMEN PELVIS W IV CONTRAST    - Impression -  Left colon and sigmoid diverticulosis with an area of acute  diverticulitis in the mid sigmoid, including adjacent fat stranding,  wall thickening, and multiple small apparently intramural abscesses.  There is no pneumoperitoneum and there is no bowel obstruction based  on this exam.    Previous hysterectomy.    Subtle 15 x 12 mm rounded hypodensity inferiorly in segment 4 of the  liver. Differential considerations include hemangioma, focal " "fatty  replacement, and less likely hepatic abscess. Consider liver MRI in  follow-up.    MACRO:  Toby Knutson discussed the significance and urgency of this critical  finding by EPIC secure chat with  МАРИНА BRIAN on 7/1/2025 at 12:00  pm.  (**-RCF-**) Findings:  See findings.    Signed by: Toby Knutson 7/1/2025 12:00 PM  Dictation workstation:   [id number]         Lab Results:   Lab Results   Component Value Date    WBC 10.1 07/02/2025    HGB 11.8 (L) 07/02/2025    HCT 36.8 07/02/2025    MCV 86 07/02/2025     07/02/2025     Lab Results   Component Value Date    GLUCOSE 92 07/02/2025    CALCIUM 8.4 (L) 07/02/2025     07/02/2025    K 3.8 07/02/2025    CO2 23 07/02/2025     07/02/2025    BUN 9 07/02/2025    CREATININE 0.56 07/02/2025     Results from last 72 hours   Lab Units 07/01/25  1407   ALK PHOS U/L 113   BILIRUBIN TOTAL mg/dL 0.5   BILIRUBIN DIRECT mg/dL 0.1   PROTEIN TOTAL g/dL 6.5   ALT U/L 24   AST U/L 24   ALBUMIN g/dL 3.8     Estimated Creatinine Clearance: 102.8 mL/min (by C-G formula based on SCr of 0.56 mg/dL).  No results found for: \"CRP\"          Assessment/Plan   Karyna Noguera is a 65 y.o. female on day 1 of admission presenting with sigmoid diverticulitis with multiple small abscesses, nondrainable. No leukocytosis. Some mild LLQ pain but nonperitonitic    Suspect that this will respond well to IV antibiotics without need for surgery or IR drainage   Can start full liquid diet  Trend labs, abdominal exam  Rec Colonoscopy in 6 weeks   Will follow along      Discussed with Dr. Stephenson    I spent 45 minutes in the professional and overall care of this patient.      Arlette Parsons PA-C  Department of Surgical Services  [facility]          [1]   Family History  Problem Relation Name Age of Onset    Stomach cancer Mother Elisha     Ovarian cancer Mother Elisha     Miscarriages / Stillbirths Mother Elisha     Cancer Mother Elisha     Colon cancer Brother      " Throat cancer Brother      Diabetes Father Bonifacio     Cancer Brother Ed     Colon cancer Brother Ed     Throat cancer Brother Ed     Colon cancer Brother Ed     Cancer Brother Ed     Colon cancer Brother Ed

## 2025-07-02 NOTE — CARE PLAN
The clinical goals for the shift include pt will maintain pain levels between 5-0 on 0-10 pain scale    Over the shift, the patient did make progress toward the following goals.       Problem: Pain - Adult  Goal: Verbalizes/displays adequate comfort level or baseline comfort level  Outcome: Progressing     Problem: Safety - Adult  Goal: Free from fall injury  Outcome: Progressing     Problem: Discharge Planning  Goal: Discharge to home or other facility with appropriate resources  Outcome: Progressing     Problem: Chronic Conditions and Co-morbidities  Goal: Patient's chronic conditions and co-morbidity symptoms are monitored and maintained or improved  Outcome: Progressing     Problem: Nutrition  Goal: Nutrient intake appropriate for maintaining nutritional needs  Outcome: Progressing

## 2025-07-03 LAB
ANION GAP SERPL CALC-SCNC: 15 MMOL/L (ref 10–20)
BUN SERPL-MCNC: 5 MG/DL (ref 6–23)
CALCIUM SERPL-MCNC: 8.7 MG/DL (ref 8.6–10.3)
CHLORIDE SERPL-SCNC: 104 MMOL/L (ref 98–107)
CO2 SERPL-SCNC: 23 MMOL/L (ref 21–32)
CREAT SERPL-MCNC: 0.57 MG/DL (ref 0.5–1.05)
EGFRCR SERPLBLD CKD-EPI 2021: >90 ML/MIN/1.73M*2
ERYTHROCYTE [DISTWIDTH] IN BLOOD BY AUTOMATED COUNT: 15.1 % (ref 11.5–14.5)
GLUCOSE SERPL-MCNC: 106 MG/DL (ref 74–99)
HCT VFR BLD AUTO: 38.3 % (ref 36–46)
HGB BLD-MCNC: 12.1 G/DL (ref 12–16)
MCH RBC QN AUTO: 27.4 PG (ref 26–34)
MCHC RBC AUTO-ENTMCNC: 31.6 G/DL (ref 32–36)
MCV RBC AUTO: 87 FL (ref 80–100)
NRBC BLD-RTO: 0 /100 WBCS (ref 0–0)
PLATELET # BLD AUTO: 304 X10*3/UL (ref 150–450)
POTASSIUM SERPL-SCNC: 3.7 MMOL/L (ref 3.5–5.3)
RBC # BLD AUTO: 4.41 X10*6/UL (ref 4–5.2)
SODIUM SERPL-SCNC: 138 MMOL/L (ref 136–145)
WBC # BLD AUTO: 11.3 X10*3/UL (ref 4.4–11.3)

## 2025-07-03 PROCEDURE — 2500000001 HC RX 250 WO HCPCS SELF ADMINISTERED DRUGS (ALT 637 FOR MEDICARE OP): Performed by: INTERNAL MEDICINE

## 2025-07-03 PROCEDURE — 80048 BASIC METABOLIC PNL TOTAL CA: CPT | Performed by: INTERNAL MEDICINE

## 2025-07-03 PROCEDURE — 99232 SBSQ HOSP IP/OBS MODERATE 35: CPT | Performed by: INTERNAL MEDICINE

## 2025-07-03 PROCEDURE — 2500000004 HC RX 250 GENERAL PHARMACY W/ HCPCS (ALT 636 FOR OP/ED): Performed by: NURSE PRACTITIONER

## 2025-07-03 PROCEDURE — 99232 SBSQ HOSP IP/OBS MODERATE 35: CPT | Performed by: SURGERY

## 2025-07-03 PROCEDURE — 36415 COLL VENOUS BLD VENIPUNCTURE: CPT | Performed by: INTERNAL MEDICINE

## 2025-07-03 PROCEDURE — 99231 SBSQ HOSP IP/OBS SF/LOW 25: CPT

## 2025-07-03 PROCEDURE — 85027 COMPLETE CBC AUTOMATED: CPT | Performed by: INTERNAL MEDICINE

## 2025-07-03 PROCEDURE — 2500000004 HC RX 250 GENERAL PHARMACY W/ HCPCS (ALT 636 FOR OP/ED): Performed by: INTERNAL MEDICINE

## 2025-07-03 PROCEDURE — 1100000001 HC PRIVATE ROOM DAILY

## 2025-07-03 RX ORDER — HYDROMORPHONE HYDROCHLORIDE 1 MG/ML
0.6 INJECTION, SOLUTION INTRAMUSCULAR; INTRAVENOUS; SUBCUTANEOUS
Status: DISPENSED | OUTPATIENT
Start: 2025-07-03

## 2025-07-03 RX ADMIN — SENNOSIDES AND DOCUSATE SODIUM 1 TABLET: 50; 8.6 TABLET ORAL at 08:17

## 2025-07-03 RX ADMIN — HYDROMORPHONE HYDROCHLORIDE 0.6 MG: 1 INJECTION, SOLUTION INTRAMUSCULAR; INTRAVENOUS; SUBCUTANEOUS at 14:25

## 2025-07-03 RX ADMIN — PIPERACILLIN SODIUM AND TAZOBACTAM SODIUM 3.38 G: 3; .375 INJECTION, SOLUTION INTRAVENOUS at 10:23

## 2025-07-03 RX ADMIN — POLYETHYLENE GLYCOL 3350 17 G: 17 POWDER, FOR SOLUTION ORAL at 08:18

## 2025-07-03 RX ADMIN — HYDROMORPHONE HYDROCHLORIDE 0.6 MG: 1 INJECTION, SOLUTION INTRAMUSCULAR; INTRAVENOUS; SUBCUTANEOUS at 10:28

## 2025-07-03 RX ADMIN — BISACODYL 10 MG: 10 SUPPOSITORY RECTAL at 14:25

## 2025-07-03 RX ADMIN — HYDROMORPHONE HYDROCHLORIDE 0.6 MG: 1 INJECTION, SOLUTION INTRAMUSCULAR; INTRAVENOUS; SUBCUTANEOUS at 21:13

## 2025-07-03 RX ADMIN — POLYETHYLENE GLYCOL 3350 17 G: 17 POWDER, FOR SOLUTION ORAL at 21:13

## 2025-07-03 RX ADMIN — MORPHINE SULFATE 1 MG: 2 INJECTION, SOLUTION INTRAMUSCULAR; INTRAVENOUS at 08:17

## 2025-07-03 RX ADMIN — PIPERACILLIN SODIUM AND TAZOBACTAM SODIUM 3.38 G: 3; .375 INJECTION, SOLUTION INTRAVENOUS at 15:55

## 2025-07-03 RX ADMIN — PANTOPRAZOLE SODIUM 40 MG: 40 TABLET, DELAYED RELEASE ORAL at 06:20

## 2025-07-03 RX ADMIN — SENNOSIDES AND DOCUSATE SODIUM 1 TABLET: 50; 8.6 TABLET ORAL at 21:13

## 2025-07-03 RX ADMIN — PIPERACILLIN SODIUM AND TAZOBACTAM SODIUM 3.38 G: 3; .375 INJECTION, SOLUTION INTRAVENOUS at 21:13

## 2025-07-03 RX ADMIN — PIPERACILLIN SODIUM AND TAZOBACTAM SODIUM 3.38 G: 3; .375 INJECTION, SOLUTION INTRAVENOUS at 05:35

## 2025-07-03 RX ADMIN — Medication 50 MCG: at 08:17

## 2025-07-03 RX ADMIN — ATORVASTATIN CALCIUM 80 MG: 80 TABLET, FILM COATED ORAL at 21:13

## 2025-07-03 RX ADMIN — AMLODIPINE BESYLATE 5 MG: 5 TABLET ORAL at 08:18

## 2025-07-03 ASSESSMENT — PAIN DESCRIPTION - LOCATION
LOCATION: ABDOMEN

## 2025-07-03 ASSESSMENT — PAIN - FUNCTIONAL ASSESSMENT
PAIN_FUNCTIONAL_ASSESSMENT: 0-10

## 2025-07-03 ASSESSMENT — PAIN SCALES - GENERAL
PAINLEVEL_OUTOF10: 7
PAINLEVEL_OUTOF10: 4
PAINLEVEL_OUTOF10: 7
PAINLEVEL_OUTOF10: 7
PAINLEVEL_OUTOF10: 4
PAINLEVEL_OUTOF10: 7
PAINLEVEL_OUTOF10: 4
PAINLEVEL_OUTOF10: 7

## 2025-07-03 ASSESSMENT — PAIN DESCRIPTION - DESCRIPTORS
DESCRIPTORS: SHARP
DESCRIPTORS: ACHING

## 2025-07-03 NOTE — PROGRESS NOTES
"Karyna Noguera is a 65 y.o. female on day 2 of admission presenting with Colonic diverticular abscess.    Assessment/Plan   Patient with moderate severity diverticulitis. Fulls ok. Would continue IV abx.   Consider repeat CT 10-2 days if still having this degree pain    Subjective   Feeling a little better, still pain LLQ.        Objective     Physical Exam  NAD  A&Ox3  Non icteric  CTA  RR  Abdomen soft min tender. Wounds clean, intact  Extremities warm, well perfused     Last Recorded Vitals  Blood pressure 135/81, pulse 79, temperature 36.7 °C (98.1 °F), temperature source Temporal, resp. rate 18, height 1.651 m (5' 5\"), weight 81.6 kg (180 lb), SpO2 94%.  Intake/Output last 3 Shifts:  I/O last 3 completed shifts:  In: 536.7 (6.6 mL/kg) [I.V.:486.7 (6 mL/kg); IV Piggyback:50]  Out: 0 (0 mL/kg)   Weight: 81.6 kg     Relevant Results    Scheduled medications  Scheduled Medications[1]  Continuous medications  Continuous Medications[2]  PRN medications  PRN Medications[3]    Results for orders placed or performed during the hospital encounter of 07/01/25 (from the past 24 hours)   CBC   Result Value Ref Range    WBC 11.3 4.4 - 11.3 x10*3/uL    nRBC 0.0 0.0 - 0.0 /100 WBCs    RBC 4.41 4.00 - 5.20 x10*6/uL    Hemoglobin 12.1 12.0 - 16.0 g/dL    Hematocrit 38.3 36.0 - 46.0 %    MCV 87 80 - 100 fL    MCH 27.4 26.0 - 34.0 pg    MCHC 31.6 (L) 32.0 - 36.0 g/dL    RDW 15.1 (H) 11.5 - 14.5 %    Platelets 304 150 - 450 x10*3/uL   Basic Metabolic Panel   Result Value Ref Range    Glucose 106 (H) 74 - 99 mg/dL    Sodium 138 136 - 145 mmol/L    Potassium 3.7 3.5 - 5.3 mmol/L    Chloride 104 98 - 107 mmol/L    Bicarbonate 23 21 - 32 mmol/L    Anion Gap 15 10 - 20 mmol/L    Urea Nitrogen 5 (L) 6 - 23 mg/dL    Creatinine 0.57 0.50 - 1.05 mg/dL    eGFR >90 >60 mL/min/1.73m*2    Calcium 8.7 8.6 - 10.3 mg/dL           I spent 25 minutes in the professional and overall care of this patient.      Nolan Stephenson MD           [1] " amLODIPine, 5 mg, oral, Daily  atorvastatin, 80 mg, oral, Nightly  bisacodyl, 10 mg, rectal, Daily  cholecalciferol, 50 mcg, oral, Daily  [Held by provider] enoxaparin, 40 mg, subcutaneous, q24h  pantoprazole, 40 mg, oral, Daily before breakfast  piperacillin-tazobactam, 3.375 g, intravenous, q6h  polyethylene glycol, 17 g, oral, BID  sennosides-docusate sodium, 1 tablet, oral, BID    [2]    [3] PRN medications: acetaminophen, alum-mag hydroxide-simeth, HYDROmorphone, melatonin, ondansetron

## 2025-07-03 NOTE — PROGRESS NOTES
"Karyna Noguera is a 65 y.o. female on day 2 of admission presenting with Colonic diverticular abscess.      Objective     Physical Exam  NAD  A&Ox3  Non icteric  CTA  RR  Abdomen soft min tender. Wounds clean, intact  Extremities warm, well perfused     Last Recorded Vitals  Blood pressure 135/81, pulse 79, temperature 36.7 °C (98.1 °F), temperature source Temporal, resp. rate 18, height 1.651 m (5' 5\"), weight 81.6 kg (180 lb), SpO2 94%.  Intake/Output last 3 Shifts:  I/O last 3 completed shifts:  In: 536.7 (6.6 mL/kg) [I.V.:486.7 (6 mL/kg); IV Piggyback:50]  Out: 0 (0 mL/kg)   Weight: 81.6 kg     Relevant Results    Scheduled medications  Scheduled Medications[1]  Continuous medications  Continuous Medications[2]  PRN medications  PRN Medications[3]    Results for orders placed or performed during the hospital encounter of 07/01/25 (from the past 24 hours)   CBC   Result Value Ref Range    WBC 11.3 4.4 - 11.3 x10*3/uL    nRBC 0.0 0.0 - 0.0 /100 WBCs    RBC 4.41 4.00 - 5.20 x10*6/uL    Hemoglobin 12.1 12.0 - 16.0 g/dL    Hematocrit 38.3 36.0 - 46.0 %    MCV 87 80 - 100 fL    MCH 27.4 26.0 - 34.0 pg    MCHC 31.6 (L) 32.0 - 36.0 g/dL    RDW 15.1 (H) 11.5 - 14.5 %    Platelets 304 150 - 450 x10*3/uL   Basic Metabolic Panel   Result Value Ref Range    Glucose 106 (H) 74 - 99 mg/dL    Sodium 138 136 - 145 mmol/L    Potassium 3.7 3.5 - 5.3 mmol/L    Chloride 104 98 - 107 mmol/L    Bicarbonate 23 21 - 32 mmol/L    Anion Gap 15 10 - 20 mmol/L    Urea Nitrogen 5 (L) 6 - 23 mg/dL    Creatinine 0.57 0.50 - 1.05 mg/dL    eGFR >90 >60 mL/min/1.73m*2    Calcium 8.7 8.6 - 10.3 mg/dL           I spent 25 minutes in the professional and overall care of this patient.      Nolan Stephenson MD           [1] amLODIPine, 5 mg, oral, Daily  atorvastatin, 80 mg, oral, Nightly  bisacodyl, 10 mg, rectal, Daily  cholecalciferol, 50 mcg, oral, Daily  [Held by provider] enoxaparin, 40 mg, subcutaneous, q24h  pantoprazole, 40 mg, oral, Daily " before breakfast  piperacillin-tazobactam, 3.375 g, intravenous, q6h  polyethylene glycol, 17 g, oral, BID  sennosides-docusate sodium, 1 tablet, oral, BID    [2]    [3] PRN medications: acetaminophen, alum-mag hydroxide-simeth, HYDROmorphone, melatonin, ondansetron

## 2025-07-03 NOTE — CARE PLAN
The clinical goals for the shift include pt will maintain safety, free from falls and injuries throughout this shift    Over the shift, the patient did make progress toward the following goals.       Problem: Pain - Adult  Goal: Verbalizes/displays adequate comfort level or baseline comfort level  Outcome: Progressing     Problem: Safety - Adult  Goal: Free from fall injury  Outcome: Progressing     Problem: Discharge Planning  Goal: Discharge to home or other facility with appropriate resources  Outcome: Progressing     Problem: Chronic Conditions and Co-morbidities  Goal: Patient's chronic conditions and co-morbidity symptoms are monitored and maintained or improved  Outcome: Progressing     Problem: Nutrition  Goal: Nutrient intake appropriate for maintaining nutritional needs  Outcome: Progressing

## 2025-07-03 NOTE — PROGRESS NOTES
Karyna Noguera is a 65 y.o. female on day 2 of admission presenting with Colonic diverticular abscess.      Subjective   Still painful, mostly the left side. Pain worse after eating. Doing ok on full liqs.     Objective     Last Recorded Vitals  /74 (BP Location: Right arm, Patient Position: Lying)   Pulse 79   Temp 36.4 °C (97.5 °F) (Temporal)   Resp 18   Wt 81.6 kg (180 lb)   SpO2 94%   Intake/Output last 3 Shifts:    Intake/Output Summary (Last 24 hours) at 7/3/2025 1801  Last data filed at 7/3/2025 1555  Gross per 24 hour   Intake 650 ml   Output --   Net 650 ml       Admission Weight  Weight: 81.6 kg (180 lb) (07/01/25 2102)    Daily Weight  07/01/25 : 81.6 kg (180 lb)    Image Results  CT abdomen pelvis w IV contrast  Narrative: Interpreted By:  Toby Kntuson,   STUDY:  CT ABDOMEN PELVIS W IV CONTRAST;  7/1/2025 11:23 am      INDICATION:  66 y/o   F with  Signs/Symptoms:abdominal pain eval for  diverticulitis. ,K57.30 Diverticulosis of large intestine without  perforation or abscess without bleeding,R10.9 Unspecified abdominal  pain      LIMITATIONS:  None.      ACCESSION NUMBER(S):  [id number]      ORDERING CLINICIAN:  МАРИНА BRIAN      TECHNIQUE:  After the administration of   oral water and IV nonionic contrast,  spiral axial images were obtained from the xiphoid down through the  symphysis pubis. Sagittal and coronal reconstruction images were  generated. Bone, mediastinal, lung, and liver windows were reviewed.  Omnipaque 350   68 ML      COMPARISON:  Prior exam from 10/19/2023..      FINDINGS:  LUNG BASES:  No mass or pneumonia or pleural effusion in either lung base..      LIVER:  No hepatomegaly.  Liver density was  within the limits of normal.  There is a subtle hypodense lesion inferiorly in segment 4 of the  liver measuring 15 x 12 mm on image 42..      GALLBLADDER:  No calcified stone, gallbladder wall thickening, or adjacent edema.      BILE DUCTS:  No intrahepatic biliary ductal  dilatation.  Common bile duct was  within the limits of normal.      SPLEEN:  No splenomegaly.  No splenic mass..      PANCREAS:  No pancreatic mass or inflammation, or ductal dilatation.      KIDNEYS/ADRENALS:  No adrenal mass or enlargement.  No calcified stone, hydronephrosis, mass, or perinephric edema in  either kidney. No ureteral stone or dilatation.      BLADDER/PELVIS:  Urinary bladder was grossly intact.  Previous hysterectomy.  No gross adnexal mass.      GREAT VESSELS/RETROPERITONEUM:  Abdominal aorta and IVC were intact.  No suspicious retroperitoneal adenopathy.  No suspicious mesenteric adenopathy.  No suspicious pelvic or inguinal adenopathy.      PERITONEUM:  See below.      BOWEL:  The stomach was  grossly intact.  There was no small bowel dilatation or small bowel wall thickening.  No small-bowel obstruction. Moderate diffuse retained colonic stool.  Left colon and sigmoid diverticulosis. There is mural thickening with  adjacent fat stranding in the mid sigmoid, in the paracentral left  pelvis on axial images 112 through 128. There are multiple small  associated abscesses that appear to be intramural. These measure up  to 36 x 36 mm in greatest axial dimensions. There is edema along the  left lateral pelvic sidewall and slight edema in the cold the sac.  There is no pneumoperitoneum. No bowel obstruction. The cecal  appendix could not be identified.      BONES:  No destructive lytic or blastic bone lesion. Mild-to-moderate  multilevel lumbar spine endplate osteophytosis. Mild disc space  narrowing at L5-S1. Sclerotic arthritic changes in both SI joints.      ABDOMINAL WALL:  Unremarkable.      Impression: Left colon and sigmoid diverticulosis with an area of acute  diverticulitis in the mid sigmoid, including adjacent fat stranding,  wall thickening, and multiple small apparently intramural abscesses.  There is no pneumoperitoneum and there is no bowel obstruction based  on this exam.       Previous hysterectomy.      Subtle 15 x 12 mm rounded hypodensity inferiorly in segment 4 of the  liver. Differential considerations include hemangioma, focal fatty  replacement, and less likely hepatic abscess. Consider liver MRI in  follow-up.      MACRO:  Toby Knutson discussed the significance and urgency of this critical  finding by EPIC secure chat with  МАРИНА SNEHAL on 7/1/2025 at 12:00  pm.  (**-RCF-**) Findings:  See findings.      Signed by: Toby Knutson 7/1/2025 12:00 PM  Dictation workstation:   [id number]      Physical Exam  Constitutional:       General: She is not in acute distress.     Appearance: Normal appearance.   HENT:      Head: Normocephalic and atraumatic.   Eyes:      Pupils: Pupils are equal, round, and reactive to light.   Cardiovascular:      Rate and Rhythm: Normal rate and regular rhythm.   Pulmonary:      Effort: Pulmonary effort is normal. No respiratory distress.      Breath sounds: Normal breath sounds. No wheezing.   Abdominal:      General: Bowel sounds are normal. There is no distension.      Palpations: Abdomen is soft.      Tenderness: There is abdominal tenderness.   Musculoskeletal:         General: Normal range of motion.      Right lower leg: No edema.      Left lower leg: No edema.   Skin:     General: Skin is warm and dry.      Capillary Refill: Capillary refill takes less than 2 seconds.   Neurological:      Mental Status: She is alert and oriented to person, place, and time.   Psychiatric:         Mood and Affect: Mood normal.         Behavior: Behavior normal.         Relevant Results  Scheduled medications  Scheduled Medications[1]  Continuous medications  Continuous Medications[2]  PRN medications  PRN Medications[3]   Results for orders placed or performed during the hospital encounter of 07/01/25 (from the past 24 hours)   CBC   Result Value Ref Range    WBC 11.3 4.4 - 11.3 x10*3/uL    nRBC 0.0 0.0 - 0.0 /100 WBCs    RBC 4.41 4.00 - 5.20 x10*6/uL    Hemoglobin  12.1 12.0 - 16.0 g/dL    Hematocrit 38.3 36.0 - 46.0 %    MCV 87 80 - 100 fL    MCH 27.4 26.0 - 34.0 pg    MCHC 31.6 (L) 32.0 - 36.0 g/dL    RDW 15.1 (H) 11.5 - 14.5 %    Platelets 304 150 - 450 x10*3/uL   Basic Metabolic Panel   Result Value Ref Range    Glucose 106 (H) 74 - 99 mg/dL    Sodium 138 136 - 145 mmol/L    Potassium 3.7 3.5 - 5.3 mmol/L    Chloride 104 98 - 107 mmol/L    Bicarbonate 23 21 - 32 mmol/L    Anion Gap 15 10 - 20 mmol/L    Urea Nitrogen 5 (L) 6 - 23 mg/dL    Creatinine 0.57 0.50 - 1.05 mg/dL    eGFR >90 >60 mL/min/1.73m*2    Calcium 8.7 8.6 - 10.3 mg/dL      CT abdomen pelvis w IV contrast  Result Date: 7/1/2025  Interpreted By:  Toby Knutson, STUDY: CT ABDOMEN PELVIS W IV CONTRAST;  7/1/2025 11:23 am   INDICATION: 66 y/o   F with  Signs/Symptoms:abdominal pain eval for diverticulitis. ,K57.30 Diverticulosis of large intestine without perforation or abscess without bleeding,R10.9 Unspecified abdominal pain   LIMITATIONS: None.   ACCESSION NUMBER(S): [id number]   ORDERING CLINICIAN: МАРИНА BRIAN   TECHNIQUE: After the administration of   oral water and IV nonionic contrast, spiral axial images were obtained from the xiphoid down through the symphysis pubis. Sagittal and coronal reconstruction images were generated. Bone, mediastinal, lung, and liver windows were reviewed. Omnipaque 350   68 ML   COMPARISON: Prior exam from 10/19/2023..   FINDINGS: LUNG BASES: No mass or pneumonia or pleural effusion in either lung base..   LIVER: No hepatomegaly. Liver density was  within the limits of normal. There is a subtle hypodense lesion inferiorly in segment 4 of the liver measuring 15 x 12 mm on image 42..   GALLBLADDER: No calcified stone, gallbladder wall thickening, or adjacent edema.   BILE DUCTS: No intrahepatic biliary ductal dilatation.  Common bile duct was within the limits of normal.   SPLEEN: No splenomegaly. No splenic mass..   PANCREAS: No pancreatic mass or inflammation, or ductal  dilatation.   KIDNEYS/ADRENALS: No adrenal mass or enlargement. No calcified stone, hydronephrosis, mass, or perinephric edema in either kidney. No ureteral stone or dilatation.   BLADDER/PELVIS: Urinary bladder was grossly intact. Previous hysterectomy.  No gross adnexal mass.   GREAT VESSELS/RETROPERITONEUM: Abdominal aorta and IVC were intact. No suspicious retroperitoneal adenopathy. No suspicious mesenteric adenopathy. No suspicious pelvic or inguinal adenopathy.   PERITONEUM: See below.   BOWEL: The stomach was  grossly intact. There was no small bowel dilatation or small bowel wall thickening. No small-bowel obstruction. Moderate diffuse retained colonic stool. Left colon and sigmoid diverticulosis. There is mural thickening with adjacent fat stranding in the mid sigmoid, in the paracentral left pelvis on axial images 112 through 128. There are multiple small associated abscesses that appear to be intramural. These measure up to 36 x 36 mm in greatest axial dimensions. There is edema along the left lateral pelvic sidewall and slight edema in the cold the sac. There is no pneumoperitoneum. No bowel obstruction. The cecal appendix could not be identified.   BONES: No destructive lytic or blastic bone lesion. Mild-to-moderate multilevel lumbar spine endplate osteophytosis. Mild disc space narrowing at L5-S1. Sclerotic arthritic changes in both SI joints.   ABDOMINAL WALL: Unremarkable.       Left colon and sigmoid diverticulosis with an area of acute diverticulitis in the mid sigmoid, including adjacent fat stranding, wall thickening, and multiple small apparently intramural abscesses. There is no pneumoperitoneum and there is no bowel obstruction based on this exam.   Previous hysterectomy.   Subtle 15 x 12 mm rounded hypodensity inferiorly in segment 4 of the liver. Differential considerations include hemangioma, focal fatty replacement, and less likely hepatic abscess. Consider liver MRI in follow-up.    MACRO: Toby Knutson discussed the significance and urgency of this critical finding by EPIC secure chat with  МАРИНА JONESJASON on 7/1/2025 at 12:00 pm.  (**-RCF-**) Findings:  See findings.   Signed by: Toby Knutson 7/1/2025 12:00 PM Dictation workstation:   [id number]         Assessment & Plan    Acute Diverticulitis   -CT AP 7/1/25: acute diverticulitis and multiple small intramural abscesses   -gen surg following-> gen surg considering repeat CT in 1-2 days if no pain persists   -c/w IV Zosyn   -full liqs     Hx HLD  -c/w statin     DVT prophylaxis   Lovenox on hold     Discharge Disposition   Return home when medically ready     Elisabeth Gray, APRN-CNP           [1] amLODIPine, 5 mg, oral, Daily  atorvastatin, 80 mg, oral, Nightly  bisacodyl, 10 mg, rectal, Daily  cholecalciferol, 50 mcg, oral, Daily  [Held by provider] enoxaparin, 40 mg, subcutaneous, q24h  pantoprazole, 40 mg, oral, Daily before breakfast  piperacillin-tazobactam, 3.375 g, intravenous, q6h  polyethylene glycol, 17 g, oral, BID  sennosides-docusate sodium, 1 tablet, oral, BID  [2]    [3] PRN medications: acetaminophen, alum-mag hydroxide-simeth, HYDROmorphone, melatonin, ondansetron

## 2025-07-03 NOTE — PROGRESS NOTES
Karyna Noguera is a 65 y.o. female     Still in pain  Not much improvement    Review of Systems     Constitutional: no fever, no chills, not feeling poorly, not feeling tired   Cardiovascular: no chest pain   Respiratory: no cough, wheezing or shortness of breath a  Gastrointestinal: No nausea  Neurological: no headache,   All other systems have been reviewed and are negative for complaint.       Vitals:    07/03/25 0753   BP: 135/81   Pulse: 79   Resp: 18   Temp: 36.7 °C (98.1 °F)   SpO2: 94%        Scheduled medications  Scheduled Medications[1]  Continuous medications  Continuous Medications[2]  PRN medications  PRN Medications[3]    Lab Review   Results from last 7 days   Lab Units 07/03/25  0636 07/02/25  0814 07/01/25  1407   WBC AUTO x10*3/uL 11.3 10.1 11.2   HEMOGLOBIN g/dL 12.1 11.8* 13.4   HEMATOCRIT % 38.3 36.8 42.5   PLATELETS AUTO x10*3/uL 304 289 308     Results from last 7 days   Lab Units 07/03/25  0636 07/02/25  0814 07/01/25  1407   SODIUM mmol/L 138 137 135*   POTASSIUM mmol/L 3.7 3.8 3.9   CHLORIDE mmol/L 104 104 99   CO2 mmol/L 23 23 22   BUN mg/dL 5* 9 11   CREATININE mg/dL 0.57 0.56 0.53   CALCIUM mg/dL 8.7 8.4* 8.7   PROTEIN TOTAL g/dL  --   --  6.5   BILIRUBIN TOTAL mg/dL  --   --  0.5   ALK PHOS U/L  --   --  113   ALT U/L  --   --  24   AST U/L  --   --  24   GLUCOSE mg/dL 106* 92 105*            No orders to display         Physical Exam    Constitutional   General appearance: Alert and in no acute distress.   Pulmonary   Respiratory assessment: No respiratory distress, normal respiratory rhythm and effort.    Auscultation of Lungs: Clear bilateral breath sounds.   Cardiovascular   Auscultation of heart: Apical pulse normal, heart rate and rhythm normal, normal S1 and S2, no murmurs and no pericardial rub.    Exam for edema: No peripheral edema.   Abdomen   Abdominal Exam: Tender left lower quadrant  Liver and Spleen exam: No hepato-splenomegaly.   Musculoskeletal   Examination of gait:  Normal.    Inspection of digits and nails: No clubbing or cyanosis of the fingernails.    Inspection/palpation of joints, bones and muscles: No joint swelling. Normal movement of all extremities.   Neurologic   Cranial nerves: Nerves 2-12 were intact, no focal neuro defects.         Assessment/Plan      #Acute diverticulitis with intramural abscesses  Continue IV Zosyn  Antiemetics/pain control  If pain does not show any improvement by tomorrow we will repeat a CAT scan     #Hypertension  Stable continue home medications     #Dyslipidemia  Stable continue home medications       [1] amLODIPine, 5 mg, oral, Daily  atorvastatin, 80 mg, oral, Nightly  bisacodyl, 10 mg, rectal, Daily  cholecalciferol, 50 mcg, oral, Daily  [Held by provider] enoxaparin, 40 mg, subcutaneous, q24h  pantoprazole, 40 mg, oral, Daily before breakfast  piperacillin-tazobactam, 3.375 g, intravenous, q6h  polyethylene glycol, 17 g, oral, BID  sennosides-docusate sodium, 1 tablet, oral, BID    [2]    [3] PRN medications: acetaminophen, alum-mag hydroxide-simeth, HYDROmorphone, melatonin, ondansetron

## 2025-07-04 ENCOUNTER — APPOINTMENT (OUTPATIENT)
Dept: RADIOLOGY | Facility: HOSPITAL | Age: 65
DRG: 392 | End: 2025-07-04
Payer: MEDICARE

## 2025-07-04 LAB
ANION GAP SERPL CALC-SCNC: 16 MMOL/L (ref 10–20)
BUN SERPL-MCNC: 5 MG/DL (ref 6–23)
CALCIUM SERPL-MCNC: 8.7 MG/DL (ref 8.6–10.3)
CHLORIDE SERPL-SCNC: 101 MMOL/L (ref 98–107)
CO2 SERPL-SCNC: 26 MMOL/L (ref 21–32)
CREAT SERPL-MCNC: 0.57 MG/DL (ref 0.5–1.05)
EGFRCR SERPLBLD CKD-EPI 2021: >90 ML/MIN/1.73M*2
ERYTHROCYTE [DISTWIDTH] IN BLOOD BY AUTOMATED COUNT: 15.1 % (ref 11.5–14.5)
GLUCOSE SERPL-MCNC: 90 MG/DL (ref 74–99)
HCT VFR BLD AUTO: 37.9 % (ref 36–46)
HGB BLD-MCNC: 12.1 G/DL (ref 12–16)
MCH RBC QN AUTO: 27.8 PG (ref 26–34)
MCHC RBC AUTO-ENTMCNC: 31.9 G/DL (ref 32–36)
MCV RBC AUTO: 87 FL (ref 80–100)
NRBC BLD-RTO: 0 /100 WBCS (ref 0–0)
PLATELET # BLD AUTO: 316 X10*3/UL (ref 150–450)
POTASSIUM SERPL-SCNC: 4.1 MMOL/L (ref 3.5–5.3)
RBC # BLD AUTO: 4.35 X10*6/UL (ref 4–5.2)
SODIUM SERPL-SCNC: 139 MMOL/L (ref 136–145)
WBC # BLD AUTO: 10.8 X10*3/UL (ref 4.4–11.3)

## 2025-07-04 PROCEDURE — 74177 CT ABD & PELVIS W/CONTRAST: CPT

## 2025-07-04 PROCEDURE — 2500000004 HC RX 250 GENERAL PHARMACY W/ HCPCS (ALT 636 FOR OP/ED): Performed by: INTERNAL MEDICINE

## 2025-07-04 PROCEDURE — 1100000001 HC PRIVATE ROOM DAILY

## 2025-07-04 PROCEDURE — 2550000001 HC RX 255 CONTRASTS: Performed by: INTERNAL MEDICINE

## 2025-07-04 PROCEDURE — 85027 COMPLETE CBC AUTOMATED: CPT | Performed by: INTERNAL MEDICINE

## 2025-07-04 PROCEDURE — 80051 ELECTROLYTE PANEL: CPT | Performed by: INTERNAL MEDICINE

## 2025-07-04 PROCEDURE — 36415 COLL VENOUS BLD VENIPUNCTURE: CPT | Performed by: INTERNAL MEDICINE

## 2025-07-04 PROCEDURE — 2500000001 HC RX 250 WO HCPCS SELF ADMINISTERED DRUGS (ALT 637 FOR MEDICARE OP): Performed by: INTERNAL MEDICINE

## 2025-07-04 PROCEDURE — 80048 BASIC METABOLIC PNL TOTAL CA: CPT | Performed by: INTERNAL MEDICINE

## 2025-07-04 PROCEDURE — 99232 SBSQ HOSP IP/OBS MODERATE 35: CPT | Performed by: SURGERY

## 2025-07-04 PROCEDURE — 99232 SBSQ HOSP IP/OBS MODERATE 35: CPT | Performed by: INTERNAL MEDICINE

## 2025-07-04 PROCEDURE — 74177 CT ABD & PELVIS W/CONTRAST: CPT | Performed by: STUDENT IN AN ORGANIZED HEALTH CARE EDUCATION/TRAINING PROGRAM

## 2025-07-04 RX ADMIN — PANTOPRAZOLE SODIUM 40 MG: 40 TABLET, DELAYED RELEASE ORAL at 06:20

## 2025-07-04 RX ADMIN — PIPERACILLIN SODIUM AND TAZOBACTAM SODIUM 3.38 G: 3; .375 INJECTION, SOLUTION INTRAVENOUS at 09:54

## 2025-07-04 RX ADMIN — IOHEXOL 75 ML: 350 INJECTION, SOLUTION INTRAVENOUS at 17:08

## 2025-07-04 RX ADMIN — PIPERACILLIN SODIUM AND TAZOBACTAM SODIUM 3.38 G: 3; .375 INJECTION, SOLUTION INTRAVENOUS at 15:21

## 2025-07-04 RX ADMIN — PIPERACILLIN SODIUM AND TAZOBACTAM SODIUM 3.38 G: 3; .375 INJECTION, SOLUTION INTRAVENOUS at 05:00

## 2025-07-04 RX ADMIN — HYDROMORPHONE HYDROCHLORIDE 0.6 MG: 1 INJECTION, SOLUTION INTRAMUSCULAR; INTRAVENOUS; SUBCUTANEOUS at 09:59

## 2025-07-04 RX ADMIN — POLYETHYLENE GLYCOL 3350 17 G: 17 POWDER, FOR SOLUTION ORAL at 09:54

## 2025-07-04 RX ADMIN — PIPERACILLIN SODIUM AND TAZOBACTAM SODIUM 3.38 G: 3; .375 INJECTION, SOLUTION INTRAVENOUS at 21:04

## 2025-07-04 RX ADMIN — AMLODIPINE BESYLATE 5 MG: 5 TABLET ORAL at 09:54

## 2025-07-04 RX ADMIN — Medication 50 MCG: at 09:54

## 2025-07-04 RX ADMIN — ONDANSETRON 4 MG: 2 INJECTION, SOLUTION INTRAMUSCULAR; INTRAVENOUS at 09:59

## 2025-07-04 RX ADMIN — SENNOSIDES AND DOCUSATE SODIUM 1 TABLET: 50; 8.6 TABLET ORAL at 09:54

## 2025-07-04 RX ADMIN — ATORVASTATIN CALCIUM 80 MG: 80 TABLET, FILM COATED ORAL at 21:04

## 2025-07-04 ASSESSMENT — PAIN - FUNCTIONAL ASSESSMENT: PAIN_FUNCTIONAL_ASSESSMENT: 0-10

## 2025-07-04 ASSESSMENT — PAIN DESCRIPTION - LOCATION: LOCATION: ABDOMEN

## 2025-07-04 ASSESSMENT — PAIN SCALES - GENERAL
PAINLEVEL_OUTOF10: 4
PAINLEVEL_OUTOF10: 7

## 2025-07-04 NOTE — PROGRESS NOTES
Karyna Noguera is a 65 y.o. female     Pain a little better but persists  Repeat CAT scan ordered    Review of Systems     Constitutional: no fever, no chills, not feeling poorly, not feeling tired   Cardiovascular: no chest pain   Respiratory: no cough, wheezing or shortness of breath a  Gastrointestinal: No nausea  Neurological: no headache,   All other systems have been reviewed and are negative for complaint.       Vitals:    07/04/25 1450   BP: 114/70   Pulse: 81   Resp: 17   Temp: 37 °C (98.6 °F)   SpO2: 92%        Scheduled medications  Scheduled Medications[1]  Continuous medications  Continuous Medications[2]  PRN medications  PRN Medications[3]    Lab Review   Results from last 7 days   Lab Units 07/04/25  0510 07/03/25  0636 07/02/25  0814   WBC AUTO x10*3/uL 10.8 11.3 10.1   HEMOGLOBIN g/dL 12.1 12.1 11.8*   HEMATOCRIT % 37.9 38.3 36.8   PLATELETS AUTO x10*3/uL 316 304 289     Results from last 7 days   Lab Units 07/04/25  0510 07/03/25  0636 07/02/25  0814 07/01/25  1407   SODIUM mmol/L 139 138 137 135*   POTASSIUM mmol/L 4.1 3.7 3.8 3.9   CHLORIDE mmol/L 101 104 104 99   CO2 mmol/L 26 23 23 22   BUN mg/dL 5* 5* 9 11   CREATININE mg/dL 0.57 0.57 0.56 0.53   CALCIUM mg/dL 8.7 8.7 8.4* 8.7   PROTEIN TOTAL g/dL  --   --   --  6.5   BILIRUBIN TOTAL mg/dL  --   --   --  0.5   ALK PHOS U/L  --   --   --  113   ALT U/L  --   --   --  24   AST U/L  --   --   --  24   GLUCOSE mg/dL 90 106* 92 105*            CT abdomen pelvis w IV contrast    (Results Pending)         Physical Exam    Constitutional   General appearance: Alert and in no acute distress.   Pulmonary   Respiratory assessment: No respiratory distress, normal respiratory rhythm and effort.    Auscultation of Lungs: Clear bilateral breath sounds.   Cardiovascular   Auscultation of heart: Apical pulse normal, heart rate and rhythm normal, normal S1 and S2, no murmurs and no pericardial rub.    Exam for edema: No peripheral edema.   Abdomen   Abdominal  Exam: Tender left lower quadrant  Liver and Spleen exam: No hepato-splenomegaly.   Musculoskeletal   Examination of gait: Normal.    Inspection of digits and nails: No clubbing or cyanosis of the fingernails.    Inspection/palpation of joints, bones and muscles: No joint swelling. Normal movement of all extremities.   Neurologic   Cranial nerves: Nerves 2-12 were intact, no focal neuro defects.         Assessment/Plan      #Acute diverticulitis with intramural abscesses  Continue IV Zosyn  Antiemetics/pain control  Repeating CAT scan     #Hypertension  Stable continue home medications     #Dyslipidemia  Stable continue home medications         [1] amLODIPine, 5 mg, oral, Daily  atorvastatin, 80 mg, oral, Nightly  bisacodyl, 10 mg, rectal, Daily  cholecalciferol, 50 mcg, oral, Daily  [Held by provider] enoxaparin, 40 mg, subcutaneous, q24h  pantoprazole, 40 mg, oral, Daily before breakfast  piperacillin-tazobactam, 3.375 g, intravenous, q6h  polyethylene glycol, 17 g, oral, BID  sennosides-docusate sodium, 1 tablet, oral, BID     [2]    [3] PRN medications: acetaminophen, alum-mag hydroxide-simeth, HYDROmorphone, melatonin, ondansetron

## 2025-07-04 NOTE — PROGRESS NOTES
"Karyna Noguera is a 65 y.o. female on day 3 of admission presenting with Colonic diverticular abscess.    Assessment/Plan   Patient with moderate severity diverticulitis.  Ordering repeat CAT scan today to see if there is any evolution of abscess.  Continue IV antibiotics.  Clear liquids for now    Subjective   Still having the left lower quadrant pain.  Feeling a little more bloated and nauseated       Objective     Physical Exam  NAD  A&Ox3  Non icteric  CTA  RR  Abdomen soft tender with guarding left lower quadrant still  Extremities warm, well perfused     Last Recorded Vitals  Blood pressure 120/67, pulse 86, temperature 37.1 °C (98.7 °F), temperature source Temporal, resp. rate 17, height 1.651 m (5' 5\"), weight 81.6 kg (180 lb), SpO2 92%.  Intake/Output last 3 Shifts:  I/O last 3 completed shifts:  In: 800 (9.8 mL/kg) [P.O.:800]  Out: 900 (11 mL/kg) [Urine:900 (0.3 mL/kg/hr)]  Weight: 81.6 kg     Relevant Results    Scheduled medications  Scheduled Medications[1]  Continuous medications  Continuous Medications[2]  PRN medications  PRN Medications[3]    Results for orders placed or performed during the hospital encounter of 07/01/25 (from the past 24 hours)   CBC   Result Value Ref Range    WBC 10.8 4.4 - 11.3 x10*3/uL    nRBC 0.0 0.0 - 0.0 /100 WBCs    RBC 4.35 4.00 - 5.20 x10*6/uL    Hemoglobin 12.1 12.0 - 16.0 g/dL    Hematocrit 37.9 36.0 - 46.0 %    MCV 87 80 - 100 fL    MCH 27.8 26.0 - 34.0 pg    MCHC 31.9 (L) 32.0 - 36.0 g/dL    RDW 15.1 (H) 11.5 - 14.5 %    Platelets 316 150 - 450 x10*3/uL   Basic Metabolic Panel   Result Value Ref Range    Glucose 90 74 - 99 mg/dL    Sodium 139 136 - 145 mmol/L    Potassium 4.1 3.5 - 5.3 mmol/L    Chloride 101 98 - 107 mmol/L    Bicarbonate 26 21 - 32 mmol/L    Anion Gap 16 10 - 20 mmol/L    Urea Nitrogen 5 (L) 6 - 23 mg/dL    Creatinine 0.57 0.50 - 1.05 mg/dL    eGFR >90 >60 mL/min/1.73m*2    Calcium 8.7 8.6 - 10.3 mg/dL           I spent 25 minutes in the professional " and overall care of this patient.      Nolan Stephenson MD           [1] amLODIPine, 5 mg, oral, Daily  atorvastatin, 80 mg, oral, Nightly  bisacodyl, 10 mg, rectal, Daily  cholecalciferol, 50 mcg, oral, Daily  [Held by provider] enoxaparin, 40 mg, subcutaneous, q24h  pantoprazole, 40 mg, oral, Daily before breakfast  piperacillin-tazobactam, 3.375 g, intravenous, q6h  polyethylene glycol, 17 g, oral, BID  sennosides-docusate sodium, 1 tablet, oral, BID    [2]    [3] PRN medications: acetaminophen, alum-mag hydroxide-simeth, HYDROmorphone, melatonin, ondansetron

## 2025-07-04 NOTE — CARE PLAN
The patient's goals for the shift include      The clinical goals for the shift include patient will tolerate PO intake by end of day    Over the shift, the patient did not make progress toward the following goals. Barriers to progression include . Recommendations to address these barriers include .

## 2025-07-05 LAB
ANION GAP SERPL CALC-SCNC: 15 MMOL/L (ref 10–20)
BUN SERPL-MCNC: 4 MG/DL (ref 6–23)
CALCIUM SERPL-MCNC: 8.7 MG/DL (ref 8.6–10.3)
CHLORIDE SERPL-SCNC: 102 MMOL/L (ref 98–107)
CO2 SERPL-SCNC: 25 MMOL/L (ref 21–32)
CREAT SERPL-MCNC: 0.58 MG/DL (ref 0.5–1.05)
EGFRCR SERPLBLD CKD-EPI 2021: >90 ML/MIN/1.73M*2
ERYTHROCYTE [DISTWIDTH] IN BLOOD BY AUTOMATED COUNT: 14.8 % (ref 11.5–14.5)
GLUCOSE SERPL-MCNC: 106 MG/DL (ref 74–99)
HCT VFR BLD AUTO: 36 % (ref 36–46)
HGB BLD-MCNC: 11.8 G/DL (ref 12–16)
MCH RBC QN AUTO: 27.8 PG (ref 26–34)
MCHC RBC AUTO-ENTMCNC: 32.8 G/DL (ref 32–36)
MCV RBC AUTO: 85 FL (ref 80–100)
NRBC BLD-RTO: 0 /100 WBCS (ref 0–0)
PLATELET # BLD AUTO: 344 X10*3/UL (ref 150–450)
POTASSIUM SERPL-SCNC: 3.4 MMOL/L (ref 3.5–5.3)
RBC # BLD AUTO: 4.24 X10*6/UL (ref 4–5.2)
SODIUM SERPL-SCNC: 139 MMOL/L (ref 136–145)
WBC # BLD AUTO: 9.1 X10*3/UL (ref 4.4–11.3)

## 2025-07-05 PROCEDURE — 2500000002 HC RX 250 W HCPCS SELF ADMINISTERED DRUGS (ALT 637 FOR MEDICARE OP, ALT 636 FOR OP/ED): Performed by: INTERNAL MEDICINE

## 2025-07-05 PROCEDURE — 80048 BASIC METABOLIC PNL TOTAL CA: CPT | Performed by: INTERNAL MEDICINE

## 2025-07-05 PROCEDURE — 99232 SBSQ HOSP IP/OBS MODERATE 35: CPT

## 2025-07-05 PROCEDURE — 2500000004 HC RX 250 GENERAL PHARMACY W/ HCPCS (ALT 636 FOR OP/ED): Performed by: INTERNAL MEDICINE

## 2025-07-05 PROCEDURE — 2500000001 HC RX 250 WO HCPCS SELF ADMINISTERED DRUGS (ALT 637 FOR MEDICARE OP): Performed by: INTERNAL MEDICINE

## 2025-07-05 PROCEDURE — 99232 SBSQ HOSP IP/OBS MODERATE 35: CPT | Performed by: INTERNAL MEDICINE

## 2025-07-05 PROCEDURE — 99232 SBSQ HOSP IP/OBS MODERATE 35: CPT | Performed by: SURGERY

## 2025-07-05 PROCEDURE — 1100000001 HC PRIVATE ROOM DAILY

## 2025-07-05 PROCEDURE — 85027 COMPLETE CBC AUTOMATED: CPT | Performed by: INTERNAL MEDICINE

## 2025-07-05 PROCEDURE — 36415 COLL VENOUS BLD VENIPUNCTURE: CPT | Performed by: INTERNAL MEDICINE

## 2025-07-05 RX ORDER — POTASSIUM CHLORIDE 20 MEQ/1
20 TABLET, EXTENDED RELEASE ORAL ONCE
Status: COMPLETED | OUTPATIENT
Start: 2025-07-05 | End: 2025-07-05

## 2025-07-05 RX ADMIN — ACETAMINOPHEN 650 MG: 325 TABLET ORAL at 09:58

## 2025-07-05 RX ADMIN — PIPERACILLIN SODIUM AND TAZOBACTAM SODIUM 3.38 G: 3; .375 INJECTION, SOLUTION INTRAVENOUS at 04:28

## 2025-07-05 RX ADMIN — PANTOPRAZOLE SODIUM 40 MG: 40 TABLET, DELAYED RELEASE ORAL at 07:18

## 2025-07-05 RX ADMIN — PIPERACILLIN SODIUM AND TAZOBACTAM SODIUM 3.38 G: 3; .375 INJECTION, SOLUTION INTRAVENOUS at 09:58

## 2025-07-05 RX ADMIN — ACETAMINOPHEN 650 MG: 325 TABLET ORAL at 17:54

## 2025-07-05 RX ADMIN — PIPERACILLIN SODIUM AND TAZOBACTAM SODIUM 3.38 G: 3; .375 INJECTION, SOLUTION INTRAVENOUS at 16:48

## 2025-07-05 RX ADMIN — AMLODIPINE BESYLATE 5 MG: 5 TABLET ORAL at 09:58

## 2025-07-05 RX ADMIN — PIPERACILLIN SODIUM AND TAZOBACTAM SODIUM 3.38 G: 3; .375 INJECTION, SOLUTION INTRAVENOUS at 21:24

## 2025-07-05 RX ADMIN — Medication 50 MCG: at 09:58

## 2025-07-05 RX ADMIN — POTASSIUM CHLORIDE 20 MEQ: 1500 TABLET, EXTENDED RELEASE ORAL at 12:29

## 2025-07-05 RX ADMIN — ATORVASTATIN CALCIUM 80 MG: 80 TABLET, FILM COATED ORAL at 20:53

## 2025-07-05 RX ADMIN — HYDROMORPHONE HYDROCHLORIDE 0.6 MG: 1 INJECTION, SOLUTION INTRAMUSCULAR; INTRAVENOUS; SUBCUTANEOUS at 04:28

## 2025-07-05 ASSESSMENT — PAIN - FUNCTIONAL ASSESSMENT
PAIN_FUNCTIONAL_ASSESSMENT: 0-10

## 2025-07-05 ASSESSMENT — COGNITIVE AND FUNCTIONAL STATUS - GENERAL
MOBILITY SCORE: 24
DRESSING REGULAR UPPER BODY CLOTHING: A LITTLE
DAILY ACTIVITIY SCORE: 23

## 2025-07-05 ASSESSMENT — PAIN SCALES - GENERAL
PAINLEVEL_OUTOF10: 5 - MODERATE PAIN
PAINLEVEL_OUTOF10: 4
PAINLEVEL_OUTOF10: 0 - NO PAIN
PAINLEVEL_OUTOF10: 0 - NO PAIN
PAINLEVEL_OUTOF10: 4
PAINLEVEL_OUTOF10: 4
PAINLEVEL_OUTOF10: 5 - MODERATE PAIN

## 2025-07-05 ASSESSMENT — PAIN DESCRIPTION - LOCATION
LOCATION: ABDOMEN
LOCATION: ABDOMEN

## 2025-07-05 NOTE — PROGRESS NOTES
"Karyna Noguera is a 65 y.o. female on day 4 of admission presenting with Colonic diverticular abscess.    Assessment/Plan   Slow improvement on IV abx. CT reviewed; no drainable abscess.  Continue current regimen.     Subjective   Feels slightly better. Hungry       Objective     Physical Exam  NAD  A&Ox3  Non icteric  CTA  RR  Abdomen soft a little less tender LLQ  Extremities warm, well perfused     Last Recorded Vitals  Blood pressure 148/89, pulse 72, temperature 36.6 °C (97.9 °F), temperature source Temporal, resp. rate 17, height 1.651 m (5' 5\"), weight 81.6 kg (180 lb), SpO2 94%.  Intake/Output last 3 Shifts:  I/O last 3 completed shifts:  In: 700 (8.6 mL/kg) [P.O.:700]  Out: 900 (11 mL/kg) [Urine:900 (0.3 mL/kg/hr)]  Weight: 81.6 kg     Relevant Results    Scheduled medications  Scheduled Medications[1]  Continuous medications  Continuous Medications[2]  PRN medications  PRN Medications[3]    Results for orders placed or performed during the hospital encounter of 07/01/25 (from the past 24 hours)   CBC   Result Value Ref Range    WBC 9.1 4.4 - 11.3 x10*3/uL    nRBC 0.0 0.0 - 0.0 /100 WBCs    RBC 4.24 4.00 - 5.20 x10*6/uL    Hemoglobin 11.8 (L) 12.0 - 16.0 g/dL    Hematocrit 36.0 36.0 - 46.0 %    MCV 85 80 - 100 fL    MCH 27.8 26.0 - 34.0 pg    MCHC 32.8 32.0 - 36.0 g/dL    RDW 14.8 (H) 11.5 - 14.5 %    Platelets 344 150 - 450 x10*3/uL   Basic Metabolic Panel   Result Value Ref Range    Glucose 106 (H) 74 - 99 mg/dL    Sodium 139 136 - 145 mmol/L    Potassium 3.4 (L) 3.5 - 5.3 mmol/L    Chloride 102 98 - 107 mmol/L    Bicarbonate 25 21 - 32 mmol/L    Anion Gap 15 10 - 20 mmol/L    Urea Nitrogen 4 (L) 6 - 23 mg/dL    Creatinine 0.58 0.50 - 1.05 mg/dL    eGFR >90 >60 mL/min/1.73m*2    Calcium 8.7 8.6 - 10.3 mg/dL           I spent 25 minutes in the professional and overall care of this patient.      Nolan Stephenson MD           [1] amLODIPine, 5 mg, oral, Daily  atorvastatin, 80 mg, oral, Nightly  bisacodyl, 10 " mg, rectal, Daily  cholecalciferol, 50 mcg, oral, Daily  [Held by provider] enoxaparin, 40 mg, subcutaneous, q24h  pantoprazole, 40 mg, oral, Daily before breakfast  piperacillin-tazobactam, 3.375 g, intravenous, q6h  polyethylene glycol, 17 g, oral, BID  sennosides-docusate sodium, 1 tablet, oral, BID    [2]    [3] PRN medications: acetaminophen, alum-mag hydroxide-simeth, HYDROmorphone, melatonin, ondansetron

## 2025-07-05 NOTE — PROGRESS NOTES
"Karyna Noguera is a 65 y.o. female on day 4 of admission presenting with Colonic diverticular abscess.    Subjective   Patient awake in bed this morning.        Objective     Physical Exam  Constitutional:       Appearance: Normal appearance.   Cardiovascular:      Rate and Rhythm: Normal rate and regular rhythm.   Pulmonary:      Effort: Pulmonary effort is normal.      Comments: Non labored breathing on RA  Abdominal:      Palpations: Abdomen is soft.      Tenderness: There is abdominal tenderness (LLQ).      Comments: Lap sites C/D/I, edges well approximated, covered in Dermabond.   Skin:     General: Skin is warm and dry.   Neurological:      General: No focal deficit present.      Mental Status: She is alert and oriented to person, place, and time. Mental status is at baseline.   Psychiatric:         Attention and Perception: Attention normal.         Mood and Affect: Mood normal.         Speech: Speech normal.         Behavior: Behavior normal.         Cognition and Memory: Cognition normal.         Last Recorded Vitals  Blood pressure 148/89, pulse 72, temperature 36.6 °C (97.9 °F), temperature source Temporal, resp. rate 17, height 1.651 m (5' 5\"), weight 81.6 kg (180 lb), SpO2 94%.  Intake/Output last 3 Shifts:  I/O last 3 completed shifts:  In: 700 (8.6 mL/kg) [P.O.:700]  Out: 900 (11 mL/kg) [Urine:900 (0.3 mL/kg/hr)]  Weight: 81.6 kg     Relevant Results  Scheduled medications  Scheduled Medications[1]  Continuous medications  Continuous Medications[2]  PRN medications  PRN Medications[3]   Results for orders placed or performed during the hospital encounter of 07/01/25 (from the past 24 hours)   CBC   Result Value Ref Range    WBC 9.1 4.4 - 11.3 x10*3/uL    nRBC 0.0 0.0 - 0.0 /100 WBCs    RBC 4.24 4.00 - 5.20 x10*6/uL    Hemoglobin 11.8 (L) 12.0 - 16.0 g/dL    Hematocrit 36.0 36.0 - 46.0 %    MCV 85 80 - 100 fL    MCH 27.8 26.0 - 34.0 pg    MCHC 32.8 32.0 - 36.0 g/dL    RDW 14.8 (H) 11.5 - 14.5 %    " Platelets 344 150 - 450 x10*3/uL   Basic Metabolic Panel   Result Value Ref Range    Glucose 106 (H) 74 - 99 mg/dL    Sodium 139 136 - 145 mmol/L    Potassium 3.4 (L) 3.5 - 5.3 mmol/L    Chloride 102 98 - 107 mmol/L    Bicarbonate 25 21 - 32 mmol/L    Anion Gap 15 10 - 20 mmol/L    Urea Nitrogen 4 (L) 6 - 23 mg/dL    Creatinine 0.58 0.50 - 1.05 mg/dL    eGFR >90 >60 mL/min/1.73m*2    Calcium 8.7 8.6 - 10.3 mg/dL      CT abdomen pelvis w IV contrast   Final Result   Redemonstration of sigmoid diverticulitis with inflammatory wall   thickening of background of severe diverticulosis. There is overall   no significant change in size of a multiloculated pericolonic abscess   between the sigmoid colon and the bladder dome with the overall size   on coronal imaging measures 6.1 cm x 3.8 cm. 1 of the loculated   pockets is slightly decreased in size all the others remain   unchanged. Again there is thick rim enhancement, enhancing septations   and gas locules within the abscess. This abscess completely   obliterates the fat plane between the sigmoid colon and the bladder   dome. There is secondary inflammatory wall thickening of the bladder   dome without evidence of intraluminal gas to suggest a full thickness   fistula; however, there can still be indirect vacuo seeding of the   urinary bladder as result of the aforementioned and correlation with   urinalysis recommended.        Liquid stool throughout the colon proximal to the site of   diverticulitis that could be related to a mild degree of colitis   versus malabsorption. No inflammatory wall thickening throughout the   remainder of the colon.             MACRO:   None.        Signed by: Toby Rendon 7/4/2025 6:43 PM   Dictation workstation:   [id number]                           Assessment & Plan  Colonic diverticular abscess    Plan: Diverticulitis  - Repeat CT showed slight improvement   - ADAT  - Continue to monitor abdominal exam  - IV ABX    Discussed with  Dr. Stephenson would watch a few more days on IV ABX      I spent 35 minutes in the professional and overall care of this patient.      Abner Rush PA-C         [1] amLODIPine, 5 mg, oral, Daily  atorvastatin, 80 mg, oral, Nightly  bisacodyl, 10 mg, rectal, Daily  cholecalciferol, 50 mcg, oral, Daily  [Held by provider] enoxaparin, 40 mg, subcutaneous, q24h  pantoprazole, 40 mg, oral, Daily before breakfast  piperacillin-tazobactam, 3.375 g, intravenous, q6h  polyethylene glycol, 17 g, oral, BID  sennosides-docusate sodium, 1 tablet, oral, BID  [2]    [3] PRN medications: acetaminophen, alum-mag hydroxide-simeth, HYDROmorphone, melatonin, ondansetron

## 2025-07-05 NOTE — CARE PLAN
The clinical goals for the shift include pt will maintain safety, free from falls and injuries throughout this shift    Over the shift, the patient did not make progress toward the following goals.       Problem: Pain - Adult  Goal: Verbalizes/displays adequate comfort level or baseline comfort level  Outcome: Progressing     Problem: Safety - Adult  Goal: Free from fall injury  Outcome: Progressing     Problem: Discharge Planning  Goal: Discharge to home or other facility with appropriate resources  Outcome: Progressing     Problem: Nutrition  Goal: Nutrient intake appropriate for maintaining nutritional needs  Outcome: Progressing

## 2025-07-05 NOTE — PROGRESS NOTES
Karyna Noguera is a 65 y.o. female     Pain continues to improve a little  CAT scan shows stability  Will advance diet    Review of Systems     Constitutional: no fever, no chills, not feeling poorly, not feeling tired   Cardiovascular: no chest pain   Respiratory: no cough, wheezing or shortness of breath a  Gastrointestinal: No nausea  Neurological: no headache,   All other systems have been reviewed and are negative for complaint.       Vitals:    07/05/25 0752   BP: 148/89   Pulse: 72   Resp: 17   Temp: 36.6 °C (97.9 °F)   SpO2: 94%        Scheduled medications  Scheduled Medications[1]  Continuous medications  Continuous Medications[2]  PRN medications  PRN Medications[3]    Lab Review   Results from last 7 days   Lab Units 07/05/25 0618 07/04/25 0510 07/03/25  0636   WBC AUTO x10*3/uL 9.1 10.8 11.3   HEMOGLOBIN g/dL 11.8* 12.1 12.1   HEMATOCRIT % 36.0 37.9 38.3   PLATELETS AUTO x10*3/uL 344 316 304     Results from last 7 days   Lab Units 07/05/25  0618 07/04/25  0510 07/03/25  0636 07/02/25  0814 07/01/25  1407   SODIUM mmol/L 139 139 138   < > 135*   POTASSIUM mmol/L 3.4* 4.1 3.7   < > 3.9   CHLORIDE mmol/L 102 101 104   < > 99   CO2 mmol/L 25 26 23   < > 22   BUN mg/dL 4* 5* 5*   < > 11   CREATININE mg/dL 0.58 0.57 0.57   < > 0.53   CALCIUM mg/dL 8.7 8.7 8.7   < > 8.7   PROTEIN TOTAL g/dL  --   --   --   --  6.5   BILIRUBIN TOTAL mg/dL  --   --   --   --  0.5   ALK PHOS U/L  --   --   --   --  113   ALT U/L  --   --   --   --  24   AST U/L  --   --   --   --  24   GLUCOSE mg/dL 106* 90 106*   < > 105*    < > = values in this interval not displayed.            CT abdomen pelvis w IV contrast   Final Result   Redemonstration of sigmoid diverticulitis with inflammatory wall   thickening of background of severe diverticulosis. There is overall   no significant change in size of a multiloculated pericolonic abscess   between the sigmoid colon and the bladder dome with the overall size   on coronal imaging  measures 6.1 cm x 3.8 cm. 1 of the loculated   pockets is slightly decreased in size all the others remain   unchanged. Again there is thick rim enhancement, enhancing septations   and gas locules within the abscess. This abscess completely   obliterates the fat plane between the sigmoid colon and the bladder   dome. There is secondary inflammatory wall thickening of the bladder   dome without evidence of intraluminal gas to suggest a full thickness   fistula; however, there can still be indirect vacuo seeding of the   urinary bladder as result of the aforementioned and correlation with   urinalysis recommended.        Liquid stool throughout the colon proximal to the site of   diverticulitis that could be related to a mild degree of colitis   versus malabsorption. No inflammatory wall thickening throughout the   remainder of the colon.             MACRO:   None.        Signed by: Toby Rendon 7/4/2025 6:43 PM   Dictation workstation:   [id number]            Physical Exam    Constitutional   General appearance: Alert and in no acute distress.   Pulmonary   Respiratory assessment: No respiratory distress, normal respiratory rhythm and effort.    Auscultation of Lungs: Clear bilateral breath sounds.   Cardiovascular   Auscultation of heart: Apical pulse normal, heart rate and rhythm normal, normal S1 and S2, no murmurs and no pericardial rub.    Exam for edema: No peripheral edema.   Abdomen   Abdominal Exam: Tender left lower quadrant  Liver and Spleen exam: No hepato-splenomegaly.   Musculoskeletal   Examination of gait: Normal.    Inspection of digits and nails: No clubbing or cyanosis of the fingernails.    Inspection/palpation of joints, bones and muscles: No joint swelling. Normal movement of all extremities.   Neurologic   Cranial nerves: Nerves 2-12 were intact, no focal neuro defects.         Assessment/Plan      #Acute diverticulitis with intramural abscesses  Continue IV Zosyn  Antiemetics/pain  control  Repeat CAT scan shows stability  Advancing diet     #Hypertension  Stable continue home medications     #Dyslipidemia  Stable continue home medications           [1] amLODIPine, 5 mg, oral, Daily  atorvastatin, 80 mg, oral, Nightly  bisacodyl, 10 mg, rectal, Daily  cholecalciferol, 50 mcg, oral, Daily  [Held by provider] enoxaparin, 40 mg, subcutaneous, q24h  pantoprazole, 40 mg, oral, Daily before breakfast  piperacillin-tazobactam, 3.375 g, intravenous, q6h  polyethylene glycol, 17 g, oral, BID  sennosides-docusate sodium, 1 tablet, oral, BID     [2]    [3] PRN medications: acetaminophen, alum-mag hydroxide-simeth, HYDROmorphone, melatonin, ondansetron

## 2025-07-06 VITALS
HEART RATE: 77 BPM | HEIGHT: 65 IN | WEIGHT: 180 LBS | TEMPERATURE: 98.1 F | SYSTOLIC BLOOD PRESSURE: 127 MMHG | RESPIRATION RATE: 17 BRPM | BODY MASS INDEX: 29.99 KG/M2 | DIASTOLIC BLOOD PRESSURE: 82 MMHG | OXYGEN SATURATION: 97 %

## 2025-07-06 LAB
ANION GAP SERPL CALC-SCNC: 11 MMOL/L (ref 10–20)
BUN SERPL-MCNC: 7 MG/DL (ref 6–23)
CALCIUM SERPL-MCNC: 9.4 MG/DL (ref 8.6–10.3)
CHLORIDE SERPL-SCNC: 104 MMOL/L (ref 98–107)
CO2 SERPL-SCNC: 28 MMOL/L (ref 21–32)
CREAT SERPL-MCNC: 0.63 MG/DL (ref 0.5–1.05)
EGFRCR SERPLBLD CKD-EPI 2021: >90 ML/MIN/1.73M*2
ERYTHROCYTE [DISTWIDTH] IN BLOOD BY AUTOMATED COUNT: 14.9 % (ref 11.5–14.5)
GLUCOSE SERPL-MCNC: 103 MG/DL (ref 74–99)
HCT VFR BLD AUTO: 40.9 % (ref 36–46)
HGB BLD-MCNC: 12.9 G/DL (ref 12–16)
MCH RBC QN AUTO: 27.6 PG (ref 26–34)
MCHC RBC AUTO-ENTMCNC: 31.5 G/DL (ref 32–36)
MCV RBC AUTO: 87 FL (ref 80–100)
NRBC BLD-RTO: 0 /100 WBCS (ref 0–0)
PLATELET # BLD AUTO: 376 X10*3/UL (ref 150–450)
POTASSIUM SERPL-SCNC: 3.8 MMOL/L (ref 3.5–5.3)
RBC # BLD AUTO: 4.68 X10*6/UL (ref 4–5.2)
SODIUM SERPL-SCNC: 139 MMOL/L (ref 136–145)
WBC # BLD AUTO: 8.8 X10*3/UL (ref 4.4–11.3)

## 2025-07-06 PROCEDURE — 2500000004 HC RX 250 GENERAL PHARMACY W/ HCPCS (ALT 636 FOR OP/ED): Performed by: INTERNAL MEDICINE

## 2025-07-06 PROCEDURE — 2500000001 HC RX 250 WO HCPCS SELF ADMINISTERED DRUGS (ALT 637 FOR MEDICARE OP): Performed by: INTERNAL MEDICINE

## 2025-07-06 PROCEDURE — 36415 COLL VENOUS BLD VENIPUNCTURE: CPT | Performed by: INTERNAL MEDICINE

## 2025-07-06 PROCEDURE — 99232 SBSQ HOSP IP/OBS MODERATE 35: CPT | Performed by: SURGERY

## 2025-07-06 PROCEDURE — 1100000001 HC PRIVATE ROOM DAILY

## 2025-07-06 PROCEDURE — 80048 BASIC METABOLIC PNL TOTAL CA: CPT | Performed by: INTERNAL MEDICINE

## 2025-07-06 PROCEDURE — 85027 COMPLETE CBC AUTOMATED: CPT | Performed by: INTERNAL MEDICINE

## 2025-07-06 PROCEDURE — 99231 SBSQ HOSP IP/OBS SF/LOW 25: CPT | Performed by: INTERNAL MEDICINE

## 2025-07-06 RX ADMIN — POLYETHYLENE GLYCOL 3350 17 G: 17 POWDER, FOR SOLUTION ORAL at 21:12

## 2025-07-06 RX ADMIN — ACETAMINOPHEN 650 MG: 325 TABLET ORAL at 12:39

## 2025-07-06 RX ADMIN — ACETAMINOPHEN 650 MG: 325 TABLET ORAL at 21:15

## 2025-07-06 RX ADMIN — ATORVASTATIN CALCIUM 80 MG: 80 TABLET, FILM COATED ORAL at 21:12

## 2025-07-06 RX ADMIN — AMLODIPINE BESYLATE 5 MG: 5 TABLET ORAL at 09:03

## 2025-07-06 RX ADMIN — POLYETHYLENE GLYCOL 3350 17 G: 17 POWDER, FOR SOLUTION ORAL at 09:03

## 2025-07-06 RX ADMIN — SENNOSIDES AND DOCUSATE SODIUM 1 TABLET: 50; 8.6 TABLET ORAL at 21:12

## 2025-07-06 RX ADMIN — PIPERACILLIN SODIUM AND TAZOBACTAM SODIUM 3.38 G: 3; .375 INJECTION, SOLUTION INTRAVENOUS at 04:18

## 2025-07-06 RX ADMIN — Medication 50 MCG: at 09:03

## 2025-07-06 RX ADMIN — PIPERACILLIN SODIUM AND TAZOBACTAM SODIUM 3.38 G: 3; .375 INJECTION, SOLUTION INTRAVENOUS at 21:12

## 2025-07-06 RX ADMIN — SENNOSIDES AND DOCUSATE SODIUM 1 TABLET: 50; 8.6 TABLET ORAL at 09:03

## 2025-07-06 RX ADMIN — PIPERACILLIN SODIUM AND TAZOBACTAM SODIUM 3.38 G: 3; .375 INJECTION, SOLUTION INTRAVENOUS at 11:06

## 2025-07-06 RX ADMIN — PIPERACILLIN SODIUM AND TAZOBACTAM SODIUM 3.38 G: 3; .375 INJECTION, SOLUTION INTRAVENOUS at 16:18

## 2025-07-06 RX ADMIN — PANTOPRAZOLE SODIUM 40 MG: 40 TABLET, DELAYED RELEASE ORAL at 06:22

## 2025-07-06 ASSESSMENT — PAIN - FUNCTIONAL ASSESSMENT
PAIN_FUNCTIONAL_ASSESSMENT: 0-10

## 2025-07-06 ASSESSMENT — PAIN DESCRIPTION - DESCRIPTORS
DESCRIPTORS: ACHING;CRAMPING
DESCRIPTORS: ACHING

## 2025-07-06 ASSESSMENT — PAIN DESCRIPTION - LOCATION
LOCATION: ABDOMEN
LOCATION: ABDOMEN

## 2025-07-06 ASSESSMENT — COGNITIVE AND FUNCTIONAL STATUS - GENERAL
DAILY ACTIVITIY SCORE: 23
MOBILITY SCORE: 24
DRESSING REGULAR UPPER BODY CLOTHING: A LITTLE

## 2025-07-06 ASSESSMENT — PAIN SCALES - GENERAL
PAINLEVEL_OUTOF10: 0 - NO PAIN
PAINLEVEL_OUTOF10: 5 - MODERATE PAIN
PAINLEVEL_OUTOF10: 3
PAINLEVEL_OUTOF10: 1
PAINLEVEL_OUTOF10: 0 - NO PAIN
PAINLEVEL_OUTOF10: 3

## 2025-07-06 ASSESSMENT — PAIN DESCRIPTION - ORIENTATION: ORIENTATION: LEFT;LOWER

## 2025-07-06 NOTE — PROGRESS NOTES
"Karyna Noguera is a 65 y.o. female on day 5 of admission presenting with Colonic diverticular abscess.    Assessment/Plan   Patient with moderate severity diverticulitis.  Seems to be improving.  From our perspective she could be discharged home on oral antibiotics.  Will need colonoscopy in 6 to 8 weeks    Subjective   Feels a little bit better.  Bowels have been working but no large BMs.       Objective     Physical Exam  NAD  A&Ox3  Non icteric  CTA  RR  Abdomen soft mild tenderness left lower quadrant.  Much improved compared to several days ago  Extremities warm, well perfused     Last Recorded Vitals  Blood pressure 140/77, pulse 79, temperature 36.6 °C (97.9 °F), temperature source Temporal, resp. rate 17, height 1.651 m (5' 5\"), weight 81.6 kg (180 lb), SpO2 95%.  Intake/Output last 3 Shifts:  I/O last 3 completed shifts:  In: 1150 (14.1 mL/kg) [P.O.:1150]  Out: - (0 mL/kg)   Weight: 81.6 kg     Relevant Results    Scheduled medications  Scheduled Medications[1]  Continuous medications  Continuous Medications[2]  PRN medications  PRN Medications[3]    Results for orders placed or performed during the hospital encounter of 07/01/25 (from the past 24 hours)   CBC   Result Value Ref Range    WBC 8.8 4.4 - 11.3 x10*3/uL    nRBC 0.0 0.0 - 0.0 /100 WBCs    RBC 4.68 4.00 - 5.20 x10*6/uL    Hemoglobin 12.9 12.0 - 16.0 g/dL    Hematocrit 40.9 36.0 - 46.0 %    MCV 87 80 - 100 fL    MCH 27.6 26.0 - 34.0 pg    MCHC 31.5 (L) 32.0 - 36.0 g/dL    RDW 14.9 (H) 11.5 - 14.5 %    Platelets 376 150 - 450 x10*3/uL   Basic Metabolic Panel   Result Value Ref Range    Glucose 103 (H) 74 - 99 mg/dL    Sodium 139 136 - 145 mmol/L    Potassium 3.8 3.5 - 5.3 mmol/L    Chloride 104 98 - 107 mmol/L    Bicarbonate 28 21 - 32 mmol/L    Anion Gap 11 10 - 20 mmol/L    Urea Nitrogen 7 6 - 23 mg/dL    Creatinine 0.63 0.50 - 1.05 mg/dL    eGFR >90 >60 mL/min/1.73m*2    Calcium 9.4 8.6 - 10.3 mg/dL           I spent 25 minutes in the professional " and overall care of this patient.      Nolan Stephenson MD           [1] amLODIPine, 5 mg, oral, Daily  atorvastatin, 80 mg, oral, Nightly  bisacodyl, 10 mg, rectal, Daily  cholecalciferol, 50 mcg, oral, Daily  [Held by provider] enoxaparin, 40 mg, subcutaneous, q24h  pantoprazole, 40 mg, oral, Daily before breakfast  piperacillin-tazobactam, 3.375 g, intravenous, q6h  polyethylene glycol, 17 g, oral, BID  sennosides-docusate sodium, 1 tablet, oral, BID    [2]    [3] PRN medications: acetaminophen, alum-mag hydroxide-simeth, HYDROmorphone, melatonin, ondansetron

## 2025-07-06 NOTE — CARE PLAN
The patient's goals for the shift include  pain management    The clinical goals for the shift include safety and comfort maintaned      Problem: Pain - Adult  Goal: Verbalizes/displays adequate comfort level or baseline comfort level  Outcome: Progressing     Problem: Safety - Adult  Goal: Free from fall injury  Outcome: Progressing     Problem: Discharge Planning  Goal: Discharge to home or other facility with appropriate resources  Outcome: Progressing     Problem: Chronic Conditions and Co-morbidities  Goal: Patient's chronic conditions and co-morbidity symptoms are monitored and maintained or improved  Outcome: Progressing     Problem: Nutrition  Goal: Nutrient intake appropriate for maintaining nutritional needs  Outcome: Progressing

## 2025-07-06 NOTE — CARE PLAN
The patient's goals for the shift include      The clinical goals for the shift include patient will tolerate PO intake    Over the shift, the patient did not make progress toward the following goals. Barriers to progression include . Recommendations to address these barriers include .

## 2025-07-06 NOTE — PROGRESS NOTES
Karyna Noguera is a 65 y.o. female     Pain continues to improve a little  Will watch for 1 more day on IV antibiotics hopefully home tomorrow on orals    Review of Systems     Constitutional: no fever, no chills, not feeling poorly, not feeling tired   Cardiovascular: no chest pain   Respiratory: no cough, wheezing or shortness of breath a  Gastrointestinal: No nausea  Neurological: no headache,   All other systems have been reviewed and are negative for complaint.       Vitals:    07/06/25 0821   BP: 140/77   Pulse: 79   Resp: 17   Temp: 36.6 °C (97.9 °F)   SpO2: 95%        Scheduled medications  Scheduled Medications[1]  Continuous medications  Continuous Medications[2]  PRN medications  PRN Medications[3]    Lab Review   Results from last 7 days   Lab Units 07/06/25  0654 07/05/25  0618 07/04/25  0510   WBC AUTO x10*3/uL 8.8 9.1 10.8   HEMOGLOBIN g/dL 12.9 11.8* 12.1   HEMATOCRIT % 40.9 36.0 37.9   PLATELETS AUTO x10*3/uL 376 344 316     Results from last 7 days   Lab Units 07/06/25  0654 07/05/25  0618 07/04/25  0510 07/02/25  0814 07/01/25  1407   SODIUM mmol/L 139 139 139   < > 135*   POTASSIUM mmol/L 3.8 3.4* 4.1   < > 3.9   CHLORIDE mmol/L 104 102 101   < > 99   CO2 mmol/L 28 25 26   < > 22   BUN mg/dL 7 4* 5*   < > 11   CREATININE mg/dL 0.63 0.58 0.57   < > 0.53   CALCIUM mg/dL 9.4 8.7 8.7   < > 8.7   PROTEIN TOTAL g/dL  --   --   --   --  6.5   BILIRUBIN TOTAL mg/dL  --   --   --   --  0.5   ALK PHOS U/L  --   --   --   --  113   ALT U/L  --   --   --   --  24   AST U/L  --   --   --   --  24   GLUCOSE mg/dL 103* 106* 90   < > 105*    < > = values in this interval not displayed.            CT abdomen pelvis w IV contrast   Final Result   Redemonstration of sigmoid diverticulitis with inflammatory wall   thickening of background of severe diverticulosis. There is overall   no significant change in size of a multiloculated pericolonic abscess   between the sigmoid colon and the bladder dome with the overall  size   on coronal imaging measures 6.1 cm x 3.8 cm. 1 of the loculated   pockets is slightly decreased in size all the others remain   unchanged. Again there is thick rim enhancement, enhancing septations   and gas locules within the abscess. This abscess completely   obliterates the fat plane between the sigmoid colon and the bladder   dome. There is secondary inflammatory wall thickening of the bladder   dome without evidence of intraluminal gas to suggest a full thickness   fistula; however, there can still be indirect vacuo seeding of the   urinary bladder as result of the aforementioned and correlation with   urinalysis recommended.        Liquid stool throughout the colon proximal to the site of   diverticulitis that could be related to a mild degree of colitis   versus malabsorption. No inflammatory wall thickening throughout the   remainder of the colon.             MACRO:   None.        Signed by: Toby Rendon 7/4/2025 6:43 PM   Dictation workstation:   [id number]            Physical Exam    Constitutional   General appearance: Alert and in no acute distress.   Pulmonary   Respiratory assessment: No respiratory distress, normal respiratory rhythm and effort.    Auscultation of Lungs: Clear bilateral breath sounds.   Cardiovascular   Auscultation of heart: Apical pulse normal, heart rate and rhythm normal, normal S1 and S2, no murmurs and no pericardial rub.    Exam for edema: No peripheral edema.   Abdomen   Abdominal Exam: Tender left lower quadrant  Liver and Spleen exam: No hepato-splenomegaly.   Musculoskeletal   Examination of gait: Normal.    Inspection of digits and nails: No clubbing or cyanosis of the fingernails.    Inspection/palpation of joints, bones and muscles: No joint swelling. Normal movement of all extremities.   Neurologic   Cranial nerves: Nerves 2-12 were intact, no focal neuro defects.         Assessment/Plan      #Acute diverticulitis with intramural abscesses  Continue IV  Zosyn  Antiemetics/pain control  Repeat CAT scan shows stability  Advancing diet     #Hypertension  Stable continue home medications     #Dyslipidemia  Stable continue home medications             [1] amLODIPine, 5 mg, oral, Daily  atorvastatin, 80 mg, oral, Nightly  bisacodyl, 10 mg, rectal, Daily  cholecalciferol, 50 mcg, oral, Daily  [Held by provider] enoxaparin, 40 mg, subcutaneous, q24h  pantoprazole, 40 mg, oral, Daily before breakfast  piperacillin-tazobactam, 3.375 g, intravenous, q6h  polyethylene glycol, 17 g, oral, BID  sennosides-docusate sodium, 1 tablet, oral, BID     [2]    [3] PRN medications: acetaminophen, alum-mag hydroxide-simeth, HYDROmorphone, melatonin, ondansetron

## 2025-07-07 VITALS
HEART RATE: 78 BPM | OXYGEN SATURATION: 95 % | WEIGHT: 180 LBS | BODY MASS INDEX: 29.99 KG/M2 | SYSTOLIC BLOOD PRESSURE: 128 MMHG | DIASTOLIC BLOOD PRESSURE: 73 MMHG | HEIGHT: 65 IN | TEMPERATURE: 98.6 F | RESPIRATION RATE: 18 BRPM

## 2025-07-07 DIAGNOSIS — Z12.11 COLON CANCER SCREENING: Primary | ICD-10-CM

## 2025-07-07 DIAGNOSIS — K57.30 DIVERTICULOSIS OF LARGE INTESTINE WITHOUT DIVERTICULITIS: ICD-10-CM

## 2025-07-07 DIAGNOSIS — K57.30 DIVERTICULOSIS LARGE INTESTINE W/O PERFORATION OR ABSCESS W/O BLEEDING: ICD-10-CM

## 2025-07-07 LAB
ANION GAP SERPL CALC-SCNC: 11 MMOL/L (ref 10–20)
BUN SERPL-MCNC: 11 MG/DL (ref 6–23)
CALCIUM SERPL-MCNC: 9 MG/DL (ref 8.6–10.3)
CHLORIDE SERPL-SCNC: 106 MMOL/L (ref 98–107)
CO2 SERPL-SCNC: 25 MMOL/L (ref 21–32)
CREAT SERPL-MCNC: 0.46 MG/DL (ref 0.5–1.05)
EGFRCR SERPLBLD CKD-EPI 2021: >90 ML/MIN/1.73M*2
ERYTHROCYTE [DISTWIDTH] IN BLOOD BY AUTOMATED COUNT: 14.7 % (ref 11.5–14.5)
GLUCOSE SERPL-MCNC: 103 MG/DL (ref 74–99)
HCT VFR BLD AUTO: 38.1 % (ref 36–46)
HGB BLD-MCNC: 12.5 G/DL (ref 12–16)
MCH RBC QN AUTO: 27.9 PG (ref 26–34)
MCHC RBC AUTO-ENTMCNC: 32.8 G/DL (ref 32–36)
MCV RBC AUTO: 85 FL (ref 80–100)
NRBC BLD-RTO: 0 /100 WBCS (ref 0–0)
PLATELET # BLD AUTO: 372 X10*3/UL (ref 150–450)
POTASSIUM SERPL-SCNC: 3.7 MMOL/L (ref 3.5–5.3)
RBC # BLD AUTO: 4.48 X10*6/UL (ref 4–5.2)
SODIUM SERPL-SCNC: 138 MMOL/L (ref 136–145)
WBC # BLD AUTO: 11.4 X10*3/UL (ref 4.4–11.3)

## 2025-07-07 PROCEDURE — 99239 HOSP IP/OBS DSCHRG MGMT >30: CPT | Performed by: INTERNAL MEDICINE

## 2025-07-07 PROCEDURE — 85027 COMPLETE CBC AUTOMATED: CPT | Performed by: INTERNAL MEDICINE

## 2025-07-07 PROCEDURE — 36415 COLL VENOUS BLD VENIPUNCTURE: CPT | Performed by: INTERNAL MEDICINE

## 2025-07-07 PROCEDURE — 80048 BASIC METABOLIC PNL TOTAL CA: CPT | Performed by: INTERNAL MEDICINE

## 2025-07-07 PROCEDURE — 2500000004 HC RX 250 GENERAL PHARMACY W/ HCPCS (ALT 636 FOR OP/ED): Performed by: INTERNAL MEDICINE

## 2025-07-07 PROCEDURE — 2500000001 HC RX 250 WO HCPCS SELF ADMINISTERED DRUGS (ALT 637 FOR MEDICARE OP): Performed by: INTERNAL MEDICINE

## 2025-07-07 RX ORDER — AMOXICILLIN AND CLAVULANATE POTASSIUM 875; 125 MG/1; MG/1
1 TABLET, FILM COATED ORAL 2 TIMES DAILY
Qty: 20 TABLET | Refills: 0 | Status: SHIPPED | OUTPATIENT
Start: 2025-07-07 | End: 2025-07-17

## 2025-07-07 RX ORDER — CELECOXIB 200 MG/1
200 CAPSULE ORAL 2 TIMES DAILY PRN
Qty: 30 CAPSULE | Refills: 0 | Status: SHIPPED | OUTPATIENT
Start: 2025-07-07

## 2025-07-07 RX ADMIN — PIPERACILLIN SODIUM AND TAZOBACTAM SODIUM 3.38 G: 3; .375 INJECTION, SOLUTION INTRAVENOUS at 05:00

## 2025-07-07 RX ADMIN — PIPERACILLIN SODIUM AND TAZOBACTAM SODIUM 3.38 G: 3; .375 INJECTION, SOLUTION INTRAVENOUS at 10:53

## 2025-07-07 RX ADMIN — ACETAMINOPHEN 650 MG: 325 TABLET ORAL at 06:11

## 2025-07-07 RX ADMIN — Medication 50 MCG: at 09:22

## 2025-07-07 RX ADMIN — AMLODIPINE BESYLATE 5 MG: 5 TABLET ORAL at 09:22

## 2025-07-07 RX ADMIN — PANTOPRAZOLE SODIUM 40 MG: 40 TABLET, DELAYED RELEASE ORAL at 06:07

## 2025-07-07 RX ADMIN — ACETAMINOPHEN 650 MG: 325 TABLET ORAL at 11:44

## 2025-07-07 ASSESSMENT — COGNITIVE AND FUNCTIONAL STATUS - GENERAL
MOBILITY SCORE: 24
DAILY ACTIVITIY SCORE: 24
DAILY ACTIVITIY SCORE: 24
MOBILITY SCORE: 24

## 2025-07-07 ASSESSMENT — PAIN - FUNCTIONAL ASSESSMENT
PAIN_FUNCTIONAL_ASSESSMENT: 0-10

## 2025-07-07 ASSESSMENT — PAIN DESCRIPTION - DESCRIPTORS
DESCRIPTORS: ACHING;CRAMPING
DESCRIPTORS: DULL;ACHING
DESCRIPTORS: CRAMPING

## 2025-07-07 ASSESSMENT — PAIN SCALES - GENERAL
PAINLEVEL_OUTOF10: 4
PAINLEVEL_OUTOF10: 3
PAINLEVEL_OUTOF10: 4
PAINLEVEL_OUTOF10: 3

## 2025-07-07 ASSESSMENT — PAIN DESCRIPTION - LOCATION
LOCATION: ABDOMEN
LOCATION: ABDOMEN

## 2025-07-07 NOTE — CARE PLAN
The patient's goals for the shift include      The clinical goals for the shift include safety and comfort maintaned    Over the shift, the patient did not make progress toward the following goals. Barriers to progression include . Recommendations to address these barriers include .

## 2025-07-07 NOTE — CARE PLAN
Problem: Pain - Adult  Goal: Verbalizes/displays adequate comfort level or baseline comfort level  Outcome: Adequate for Discharge     Problem: Safety - Adult  Goal: Free from fall injury  Outcome: Adequate for Discharge     Problem: Discharge Planning  Goal: Discharge to home or other facility with appropriate resources  Outcome: Adequate for Discharge     Problem: Chronic Conditions and Co-morbidities  Goal: Patient's chronic conditions and co-morbidity symptoms are monitored and maintained or improved  Outcome: Adequate for Discharge     Problem: Nutrition  Goal: Nutrient intake appropriate for maintaining nutritional needs  Outcome: Adequate for Discharge

## 2025-07-07 NOTE — DISCHARGE SUMMARY
Discharge Diagnosis  Colonic diverticular abscess           Issues Requiring Follow-Up  Colonoscopy in 8 weeks    Discharge Meds     Medication List      START taking these medications     amoxicillin-clavulanate 875-125 mg tablet; Commonly known as: Augmentin;   Take 1 tablet by mouth 2 times a day for 10 days.     CONTINUE taking these medications     Adults Multivitamin 18 mg iron-400 mcg-25 mcg tablet; Generic drug:   multivitamin with minerals   amLODIPine 5 mg tablet; Commonly known as: Norvasc; Take 1 tablet (5 mg)   by mouth once daily. Only takes in winter   atorvastatin 80 mg tablet; Commonly known as: Lipitor; Take 1 tablet (80   mg) by mouth once daily at bedtime.   calcium carbonate-vit D3-min 600 mg-10 mcg (400 unit) tablet   cholecalciferol 50 mcg (2,000 units) tablet; Commonly known as: Vitamin   D-3   fish oil concentrate 120-180 mg capsule; Commonly known as: Omega-3   garlic 200 mg tablet   meloxicam 15 mg tablet; Commonly known as: Mobic   vitamin E succinate 268 mg (400 unit) tablet     ASK your doctor about these medications     celecoxib 200 mg capsule; Commonly known as: CeleBREX; Take 1 capsule   (200 mg) by mouth 2 times a day.; Ask about: Should I take this   medication?       Test Results Pending At Discharge  Pending Labs       No current pending labs.            Hospital Course   Patient with a past medical history of hypertension dyslipidemia osteoarthritis was doing well until a few days ago when she started having abdominal pain and cramping  The pain worsened which prompted her to come to the emergency room where imaging showed acute diverticulitis with abscess     Last colonoscopy was in 2023 and a polyp was removed patient denies any fever chills nausea or vomiting    We started patient on IV Zosyn fluids and pain medications  Because the patient's pain was not improving as rapidly as we were hoping we repeated CAT scan which showed stability  Eventually after multiple doses of IV  antibiotics her pain has started improving and now is down to 4 x 10 resolved with Tylenol  # Multiple loose stool which is to be expected    Will transition to oral Augmentin for total of 10 days  Patient had a colonoscopy in 2023 and a polyp was removed  Would recommend repeat colonoscopy in 8 weeks    Will discharge patient in stable condition    Pertinent Physical Exam At Time of Discharge  Physical Exam    Constitutional   General appearance: Alert and in no acute distress.     Pulmonary   Respiratory assessment: No respiratory distress, normal respiratory rhythm and effort.    Auscultation of Lungs: Clear bilateral breath sounds.   Cardiovascular   Auscultation of heart: Apical pulse normal, heart rate and rhythm normal, normal S1 and S2, no murmurs and no pericardial rub.    Exam for edema: No peripheral edema.   Abdomen   Abdominal Exam: Mild left lower quadrant tenderness  Liver and Spleen exam: No hepato-splenomegaly.   Musculoskeletal   Examination of gait: Normal.    Inspection of digits and nails: No clubbing or cyanosis of the fingernails.    Inspection/palpation of joints, bones and muscles: No joint swelling. Normal movement of all extremities.   Skin   Skin inspection: Normal skin color and pigmentation, normal skin turgor and no visible rash.   Neurologic   Cranial nerves: Nerves 2-12 were intact, no focal neuro defects.    Outpatient Follow-Up  Future Appointments   Date Time Provider Department [place]   7/29/2025  3:40 PM Pavel Leyva MD [id number] [facility]   6/3/2026  9:45 AM Yoel Mcdonough MD [id number] [facility]     Patient seen at bedside. Events from the last visit reviewed. Discussed with staff. Results of tests and investigations from last visit reviewed and discussed with patient/Family. Electronic chart on [facility] reviewed. Input / Recommendations  from consultants  appreciated and reviewed and agreed with.     discharge summary and profile completed. medications reviewed and discussed  with patient and family.  scripts completed and signed.     total discharge time in excess of 30 minutes.      Elicia Ramsey MD

## 2025-07-07 NOTE — PROGRESS NOTES
07/07/25 1109   Discharge Planning   Home or Post Acute Services None   Expected Discharge Disposition Home     Pt has dc order in. Will dc [facility].

## 2025-07-07 NOTE — NURSING NOTE

## 2025-07-08 ENCOUNTER — PATIENT OUTREACH (OUTPATIENT)
Dept: PRIMARY CARE | Facility: CLINIC | Age: 65
End: 2025-07-08
Payer: MEDICARE

## 2025-07-08 NOTE — PROGRESS NOTES
Discharge Facility: [facility]   Discharge Diagnosis: Colonic diverticular abscess; Adhesive capsulitis of right shoulder   Admission Date: 7/1/2025   Discharge Date: 7/7/2025     PCP Appointment Date: 7/9/2025  Specialist Appointment Date: D  Hospital Encounter and Summary Linked: Yes  ED to Hosp-Admission (Discharged) with Elicia Ramsey MD; Quinn Henriquez MD (07/01/2025)   See discharge assessment below for further details  Wrap Up  Wrap Up Additional Comments: Successful outreach to the patient has been completed. The patient reports feeling well at home since discharge and denies experiencing any abdominal pain/N/V/D/fevers. We reviewed their new medications and any changes made. The patient confirmed that they have all the necessary supplies and resources at home, and they also have support from family and friends if needed. I emphasized the importance of follow-up appointments with both their primary care physician and specialists to assess treatment response. The patient is aware of my availability for non-emergency concerns and has been provided with my contact information. (7/8/2025 11:06 AM)    Engagement  Call Start Time: 1039 (7/8/2025 11:06 AM)    Medications  Medications reviewed with patient/caregiver?: Yes (new meds discussed with patient) (7/8/2025 11:06 AM)  Is the patient having any side effects they believe may be caused by any medication additions or changes?: No (7/8/2025 11:06 AM)  Does the patient have all medications ordered at discharge?: Yes (7/8/2025 11:06 AM)  Care Management Interventions: No intervention needed (7/8/2025 11:06 AM)  Prescription Comments: see med list (augmentin) (7/8/2025 11:06 AM)  Is the patient taking all medications as directed (includes completed medication regime)?: Yes (7/8/2025 11:06 AM)  Care Management Interventions: Provided patient education (7/8/2025 11:06 AM)    Appointments  Does the patient have a primary care provider?: Yes (7/8/2025  11:06 AM)  Care Management Interventions: Verified appointment date/time/provider (7/8/2025 11:06 AM)  Has the patient kept scheduled appointments due by today?: Yes (7/8/2025 11:06 AM)    Self Management  What is the home health agency?: denies need (7/8/2025 11:06 AM)  What Durable Medical Equipment (DME) was ordered?: n/a (7/8/2025 11:06 AM)    Patient Teaching  Does the patient have access to their discharge instructions?: Yes (7/8/2025 11:06 AM)  Care Management Interventions: Reviewed instructions with patient (7/8/2025 11:06 AM)  What is the patient's perception of their health status since discharge?: Improving (7/8/2025 11:06 AM)  Is the patient/caregiver able to teach back the hierarchy of who to call/visit for symptoms/problems? PCP, Specialist, Home Health nurse, Urgent Care, ED, 911: Yes (7/8/2025 11:06 AM)

## 2025-07-09 ENCOUNTER — OFFICE VISIT (OUTPATIENT)
Dept: PRIMARY CARE | Facility: CLINIC | Age: 65
End: 2025-07-09
Payer: MEDICARE

## 2025-07-09 VITALS
OXYGEN SATURATION: 97 % | HEART RATE: 75 BPM | WEIGHT: 170 LBS | DIASTOLIC BLOOD PRESSURE: 73 MMHG | HEIGHT: 66 IN | RESPIRATION RATE: 16 BRPM | BODY MASS INDEX: 27.32 KG/M2 | SYSTOLIC BLOOD PRESSURE: 113 MMHG

## 2025-07-09 DIAGNOSIS — Z09 HOSPITAL DISCHARGE FOLLOW-UP: Primary | ICD-10-CM

## 2025-07-09 DIAGNOSIS — K57.20 COLONIC DIVERTICULAR ABSCESS: ICD-10-CM

## 2025-07-09 DIAGNOSIS — K57.92 DIVERTICULITIS: ICD-10-CM

## 2025-07-09 LAB
ANION GAP SERPL CALCULATED.4IONS-SCNC: 14 MMOL/L (CALC) (ref 7–17)
BUN SERPL-MCNC: 10 MG/DL (ref 7–25)
BUN/CREAT SERPL: 23 (CALC) (ref 6–22)
CALCIUM SERPL-MCNC: 9 MG/DL (ref 8.6–10.4)
CHLORIDE SERPL-SCNC: 102 MMOL/L (ref 98–110)
CO2 SERPL-SCNC: 23 MMOL/L (ref 20–32)
CREAT SERPL-MCNC: 0.44 MG/DL (ref 0.5–1.05)
EGFRCR SERPLBLD CKD-EPI 2021: 106 ML/MIN/1.73M2
ERYTHROCYTE [DISTWIDTH] IN BLOOD BY AUTOMATED COUNT: 15.6 % (ref 11–15)
GLUCOSE SERPL-MCNC: 91 MG/DL (ref 65–99)
HCT VFR BLD AUTO: 42.5 % (ref 35–45)
HGB BLD-MCNC: 13.4 G/DL (ref 11.7–15.5)
MCH RBC QN AUTO: 28 PG (ref 27–33)
MCHC RBC AUTO-ENTMCNC: 31.5 G/DL (ref 32–36)
MCV RBC AUTO: 88.9 FL (ref 80–100)
PLATELET # BLD AUTO: 434 THOUSAND/UL (ref 140–400)
PMV BLD REES-ECKER: 9.3 FL (ref 7.5–12.5)
POTASSIUM SERPL-SCNC: 4.1 MMOL/L (ref 3.5–5.3)
RBC # BLD AUTO: 4.78 MILLION/UL (ref 3.8–5.1)
SODIUM SERPL-SCNC: 139 MMOL/L (ref 135–146)
WBC # BLD AUTO: 10.8 THOUSAND/UL (ref 3.8–10.8)

## 2025-07-09 PROCEDURE — 1159F MED LIST DOCD IN RCRD: CPT | Performed by: FAMILY MEDICINE

## 2025-07-09 PROCEDURE — 1111F DSCHRG MED/CURRENT MED MERGE: CPT | Performed by: FAMILY MEDICINE

## 2025-07-09 PROCEDURE — 99495 TRANSJ CARE MGMT MOD F2F 14D: CPT | Performed by: FAMILY MEDICINE

## 2025-07-09 PROCEDURE — 1160F RVW MEDS BY RX/DR IN RCRD: CPT | Performed by: FAMILY MEDICINE

## 2025-07-09 PROCEDURE — 3008F BODY MASS INDEX DOCD: CPT | Performed by: FAMILY MEDICINE

## 2025-07-09 NOTE — PROGRESS NOTES
"Subjective   Patient ID: Karyna Noguera is a 65 y.o. female who presents for Hospital Follow-up.    HPI   Hospitalized 7/1/25 to 7/7/25 for acute diverticulitis w/abscess.  Treatments: Abx (Zosyn during admission, home on Augmentin--taking as directe  Procedures: N/A  Medication changes: N/A  Advised to follow up with: GI  Outpatient recommendations: CBC, BMP.   Colonoscopy in 8 wks (scheduled for 9/4/25)  Since hospital discharge:   Feels ok, not really painful but has some abd ache, taking it easy, appetite is still low.     Discharge summary was reviewed.  Transitional Care Management documentation was reviewed.   Medication reconciliation was performed as indicated via the \"Rudy as Reviewed\" timestamp.   The complexity of medical decision making for this patient's transitional care is {DESC; MODERATE/HIGH:18184}.    Review of Systems  No other complaints.     Objective   /73   Pulse 75   Resp 16   Ht 1.664 m (5' 5.5\")   Wt 77.1 kg (170 lb)   LMP  (LMP Unknown)   SpO2 97%   BMI 27.86 kg/m²     Physical Exam  Constitutional:       General: She is not in acute distress.     Appearance: She is overweight.   Abdominal:      General: Bowel sounds are normal. There is no distension.      Palpations: Abdomen is soft. There is no mass.      Tenderness: There is abdominal tenderness in the suprapubic area and left lower quadrant. There is no guarding or rebound.   Neurological:      Mental Status: She is oriented to person, place, and time.   Psychiatric:         Mood and Affect: Mood normal.         Behavior: Behavior normal.     Assessment/Plan   {Assess/PlanSmartLinks:33360}    Reviewed lab results.  Finish antibiotic as directed.  Keep scheduled appointment for colonoscopy.    F/U 9 months: Annual wellness visit.   "

## 2025-07-09 NOTE — PATIENT INSTRUCTIONS
Reviewed lab results.  Finish antibiotic as directed.  Keep scheduled appointment for colonoscopy.    F/U 9 months: Annual wellness visit.

## 2025-07-11 ENCOUNTER — PATIENT OUTREACH (OUTPATIENT)
Dept: PRIMARY CARE | Facility: CLINIC | Age: 65
End: 2025-07-11
Payer: MEDICARE

## 2025-07-11 NOTE — PROGRESS NOTES
Confirmation of at least 2 patient identifiers.    Completed telephonic follow-up with patient after recent visit with Dr. Land.    Spoke to patient during outreach call.    Patient reports feeling: Improved    Patient has questions or concerns about medications: No    Have all prescribed medications been filled? Yes    Patient has necessary resources to manage their care? Yes    Patient has questions or concerns? No    Next care management follow-up approximately within one month.  Care  information provided to patient.

## 2025-07-29 ENCOUNTER — HOSPITAL ENCOUNTER (OUTPATIENT)
Dept: RADIOLOGY | Facility: EXTERNAL LOCATION | Age: 65
Discharge: HOME | End: 2025-07-29

## 2025-07-29 ENCOUNTER — APPOINTMENT (OUTPATIENT)
Dept: ORTHOPEDIC SURGERY | Facility: CLINIC | Age: 65
End: 2025-07-29
Payer: MEDICARE

## 2025-07-29 VITALS — WEIGHT: 170 LBS | HEIGHT: 66 IN | BODY MASS INDEX: 27.32 KG/M2

## 2025-07-29 DIAGNOSIS — M75.01 ADHESIVE CAPSULITIS OF RIGHT SHOULDER: Primary | ICD-10-CM

## 2025-07-29 DIAGNOSIS — M25.511 ACUTE PAIN OF RIGHT SHOULDER: ICD-10-CM

## 2025-07-29 DIAGNOSIS — M19.021 OSTEOARTHROSIS OF RIGHT ELBOW: ICD-10-CM

## 2025-07-29 PROCEDURE — 1111F DSCHRG MED/CURRENT MED MERGE: CPT | Performed by: EMERGENCY MEDICINE

## 2025-07-29 PROCEDURE — 3008F BODY MASS INDEX DOCD: CPT | Performed by: EMERGENCY MEDICINE

## 2025-07-29 PROCEDURE — 99214 OFFICE O/P EST MOD 30 MIN: CPT | Performed by: EMERGENCY MEDICINE

## 2025-07-29 PROCEDURE — 20611 DRAIN/INJ JOINT/BURSA W/US: CPT | Performed by: EMERGENCY MEDICINE

## 2025-07-29 PROCEDURE — 1159F MED LIST DOCD IN RCRD: CPT | Performed by: EMERGENCY MEDICINE

## 2025-07-29 RX ORDER — METHYLPREDNISOLONE ACETATE 40 MG/ML
80 INJECTION, SUSPENSION INTRA-ARTICULAR; INTRALESIONAL; INTRAMUSCULAR; SOFT TISSUE
Status: COMPLETED | OUTPATIENT
Start: 2025-07-29 | End: 2025-07-29

## 2025-07-29 RX ORDER — LIDOCAINE HYDROCHLORIDE 10 MG/ML
2 INJECTION, SOLUTION INFILTRATION; PERINEURAL
Status: COMPLETED | OUTPATIENT
Start: 2025-07-29 | End: 2025-07-29

## 2025-07-29 RX ADMIN — METHYLPREDNISOLONE ACETATE 80 MG: 40 INJECTION, SUSPENSION INTRA-ARTICULAR; INTRALESIONAL; INTRAMUSCULAR; SOFT TISSUE at 13:31

## 2025-07-29 RX ADMIN — LIDOCAINE HYDROCHLORIDE 2 ML: 10 INJECTION, SOLUTION INFILTRATION; PERINEURAL at 13:31

## 2025-07-29 NOTE — PROGRESS NOTES
Subjective    Patient ID: Karyna Noguera is a 65 y.o. female.    Chief Complaint: Follow-up and Pain of the Right Shoulder (Subacromial injections done on 6/3/25, state she is about 60% better. Still has trouble reach up and behind her back. )     Last Surgery: No surgery found  Last Surgery Date: No surgery found    Karyna is a 64-year-old female coming in with acute on chronic right hip pain for the past 3 months without any history of traumatic events or known injuries.  She was seen by one of my surgical partners, Dr. Ríos, who referred her to physical therapy for some possible hip flexor tendinitis or a hip flexor strain.  She also has some known right hip osteoarthritis and after speaking with her physical therapist, he and Dr. Ríos believes that she may benefit from a right hip joint injection.  She is coming in with moderate pain that is worse with activity and slightly better with rest.  She has trouble with hip flexion.  The pain is located in the right anterior groin area.  She has not had an injection in the hip before but has had cortisone in her knee I believe.  She has taken naproxen and is doing physical therapy with mild improvement. We agreed to perform a right hip ultrasound-guided cortisone injection today in the office.  The patient tolerated the procedure well without any complications and activity modifications were reviewed.  She is going to continue physical therapy with her home exercises and will follow-up with me in about a month and if she is not better at that time we could potentially do a right hip flexor cortisone injection. Referred by Dr. Ríos.     Update on 1/14/2025.  Karyna is coming back in for a follow-up visit.  She has responded very well to the right hip cortisone injection that we did on 12/17/2024.  She states that her groin pain is completely gone and that the pain that she was getting from her arthritis seems to have improved by at least 95%.  She is still taking  Aleve twice daily.  Her complaint today is that she is having a decent amount of lateral hip pain near her greater trochanter.  Again she was limping pretty severely prior to the injection that we did in December.  No trauma.  She was able to bowl once earlier this week which makes her extremely happy.  No infectious symptoms.  No recent traumatic events or known injuries.  No other complaints or today.  We discussed her treatment options and eventually agreed to do a right gluteal tendon sheath cortisone injection here today in the office under ultrasound guidance.  The patient tolerated the procedure well without any complications and activity modifications were reviewed.  She is going to continue taking Aleve as needed and can also begin doing Tylenol 1000 mg 3 times a day at prescription dosing.  She is going to follow-up with me as needed moving forward and knows that we can repeat these injections as often as every 3 months.    Update on 4/29/2025.  Karyna returns today for a new problem.  She has had 6 weeks of right shoulder injury from bowling.  She recreationally bowls twice a week.  However, 6 weeks ago, during one of her bowling nights, she felt pain in the anterior shoulder but continued to bowl.  She noticed pain and was not following through.  She notes the next morning of some cracking of her right shoulder.  Denies any specific fall or trauma.  She notes worse with lying on right side, crossarm abduction, dressing herself, putting her bra on, reaching up and back.  She has been doing Aleve 440 mg twice a day.  She has tried Aspercreme and lidocaine without benefit.  She has done sling for the last 2 weeks when she is out of house.  She has not done any physical therapy, injection, surgery.  She also follow-up on her right hip pain which has returned.  She notes relief from both right intra-articular hip and right gluteal tendon sheath corticosteroid injections. She is interested in repeating. Given  no acute mechanism and she was able to bowl through pain, she most likely has right bicep tendinitis and right rotator cuff tendinitis. Her loss of range of motion is most likely due to 6 weeks of activity modification and not using her right shoulder.  We agreed to perform ultrasound-guided right bicep tendon sheath corticosteroid injection today.  It does not appear on ultrasound of a bicep tendon rupture nor did our physical exam indicate this.  We also agreed referral to physical therapy to regain right shoulder range of motion and strength and to take meloxicam 15 mg once daily for 1 month.  She will follow-up in 2 weeks for repeat right hip injection, likely intra-articular.    Update on 5/13/2025.  Patient is coming back in for acute on chronic right hip pain secondary to some gluteal tendinitis and osteoarthritis of the right hip joint.  We did a right hip ultrasound-guided cortisone injection back on 12/17/2024 that provided her with really good pain relief and she is interested in doing another injection here today.  She has 4 out of 10 pain and is having catching in her groin area.  The pain is not constant and sometimes does radiate laterally so she may end up needing another gluteal tendon sheath injection.  Of note she is doing PT for her shoulder and states that she has felt better since her bicep tendon sheath injection but would like to come back in for evaluation of her shoulder as well.  She is taking meloxicam.  No trauma.  No infectious symptoms.  No other complaints at this time. We discussed her treatment options and agreed to repeat a right hip cortisone injection under ultrasound guidance.  The patient tolerated it well without any complications and activity modifications were reviewed.  She is going to follow-up with me as needed in regards to the right hip and knows that we could potentially repeat this injection as often as every 3 months or even try another gluteal tendon sheath  injection in the future if needed.  For the right shoulder she is going to follow-up with me in about 2 weeks.  It sounds like she may need additional imaging or a subacromial versus glenohumeral or injection under ultrasound.    Update on 5/27/2025.  Patient is coming back in for acute on chronic right shoulder pain.  We did a bicep tendon sheath injection on 4/29/2025.  It provided her with some improvement in her range of motion but she still having pretty severe pain.  Her pain right now is 5 out of 10 because she just finished up some physical therapy.  She has been taking meloxicam consistently without much improvement.  She is in tears today because the pain is becoming so frustrating.  No trauma. We had a long discussion about her treatment options and she ultimately is going to continue her physical therapy and home exercises.  We are going to switch her from meloxicam to Celebrex twice daily.  We are also going to obtain an MRI of the right shoulder to look for surgical pathology.  I am concerned about adhesive capsulitis with rotator cuff tendinitis causing some impingement but we are going to look for other potential causes of her pain and limited range of motion such as a rotator cuff or labral tear.  I will follow-up with her afterwards or will consider sending her to orthopedic surgery.  Depending on the MRI results we could also consider doing a glenohumeral or subacromial cortisone injection under ultrasound guidance.    Update on 6/3/2025.  Patient is coming back in with her acute on chronic right shoulder pain.  She is here for a follow-up visit and to review her MRI results.  She has rotator cuff and bicep tendon inflammation with some degenerative labral tearing and arthritis at her AC and glenohumeral joints.  No change in her symptoms.  She still has limited range of motion.  She is interested in continuing conservative treatment options and doing a different injection. We discussed her  treatment options and agreed for her to continue PT and her home exercise plan.  We also agreed to perform a right subacromial cortisone injection under ultrasound guidance here today in the office.  The patient tolerated the procedure well without any complications and activity modifications were reviewed.  She is going to follow-up with me in about 2 months to see how she is responding and whether or not we should also do a glenohumeral injection prior to considering a surgical referral.  At this time, she still would like to avoid surgery and there was nothing obviously surgical on her MRI so I think that this is appropriate.    Update on 7/29/2025.  Patient is coming back in with acute on chronic right shoulder pain that seems to be slowly improving.  We did a subacromial cortisone injection on 6/3/2025 that is providing her with a little bit of relief but she is still having trouble sleeping and has decreased range of motion.  Overall she thinks that she has improved by about 60%.  She still has decreased range of motion and is unable to lay on that right side.    Right Shoulder         Objective   Right Shoulder Exam     Tenderness   The patient is experiencing no tenderness.    Range of Motion   Active abduction:  90 abnormal   Passive abduction:  110 abnormal   Extension:  abnormal   External rotation:  abnormal   Forward flexion:  abnormal   Internal rotation 0 degrees:  abnormal   Internal rotation 90 degrees:  abnormal     Muscle Strength   Abduction: 5/5   Internal rotation: 5/5   External rotation: 5/5   Supraspinatus: 5/5   Subscapularis: 5/5   Biceps: 5/5     Tests   Mayo test: positive  Impingement: positive    Other   Erythema: absent  Sensation: normal    Comments:  Limited range of motion with painful strength testing  Mobility has improved ever so slightly.  Strength does seem better.      Left Shoulder Exam     Tenderness   The patient is experiencing no tenderness.            Image  Results:  No new imaging at this time    Patient ID: Karyna Noguera is a 65 y.o. female.    L Inj/Asp: R glenohumeral on 7/29/2025 1:31 PM  Indications: pain  Details: 25 G needle, ultrasound-guided posterior approach  Medications: 80 mg methylPREDNISolone acetate 40 mg/mL; 2 mL lidocaine 10 mg/mL (1 %)  Outcome: tolerated well, no immediate complications  Procedure, treatment alternatives, risks and benefits explained, specific risks discussed. Consent was given by the patient. Immediately prior to procedure a time out was called to verify the correct patient, procedure, equipment, support staff and site/side marked as required. Patient was prepped and draped in the usual sterile fashion.           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  The patient tolerated the procedure(s) well without any complications and activity modifications were reviewed. For pain control, the patient is going to use prescription dose(s) of Tylenol 3 times daily.  The patient will follow-up with me as needed depending on how they respond to this injection.  Depending on whether or not this provides relief the neck step will likely be repeat cortisone injections as often as every 3 months versus referral to see Dr. Adamson.    ** Please excuse any errors in grammar or translation related to this dictation. Voice recognition software was utilized to prepare this document. **       Pavel Leyva MD  [facility]

## 2025-09-04 ENCOUNTER — APPOINTMENT (OUTPATIENT)
Dept: GASTROENTEROLOGY | Facility: EXTERNAL LOCATION | Age: 65
End: 2025-09-04
Payer: MEDICARE